# Patient Record
Sex: FEMALE | Race: WHITE | NOT HISPANIC OR LATINO | Employment: UNEMPLOYED | URBAN - METROPOLITAN AREA
[De-identification: names, ages, dates, MRNs, and addresses within clinical notes are randomized per-mention and may not be internally consistent; named-entity substitution may affect disease eponyms.]

---

## 2023-09-08 ENCOUNTER — HOSPITAL ENCOUNTER (OUTPATIENT)
Dept: DATA CONVERSION | Facility: HOSPITAL | Age: 58
End: 2023-09-08

## 2023-10-02 ENCOUNTER — EVALUATION (OUTPATIENT)
Dept: PHYSICAL THERAPY | Facility: CLINIC | Age: 58
End: 2023-10-02
Payer: COMMERCIAL

## 2023-10-02 DIAGNOSIS — I89.0 LYMPHEDEMA, NOT ELSEWHERE CLASSIFIED: ICD-10-CM

## 2023-10-02 DIAGNOSIS — I89.0 LYMPHEDEMA: Primary | ICD-10-CM

## 2023-10-02 PROCEDURE — 97530 THERAPEUTIC ACTIVITIES: CPT | Mod: GP

## 2023-10-02 PROCEDURE — 97163 PT EVAL HIGH COMPLEX 45 MIN: CPT | Mod: GP

## 2023-10-02 ASSESSMENT — ENCOUNTER SYMPTOMS
DEPRESSION: 1
OCCASIONAL FEELINGS OF UNSTEADINESS: 1
LOSS OF SENSATION IN FEET: 0
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
## 2023-10-02 NOTE — Clinical Note
October 2, 2023     Patient: Olga Joyce   YOB: 1965   Date of Visit: 10/2/2023       To Whom it May Concern:    Olga Joyce was seen in my clinic on 10/2/2023. She {Return to school/sport:67113}.    If you have any questions or concerns, please don't hesitate to call.         Sincerely,          Gloria Abraham, PT        CC: No Recipients

## 2023-10-02 NOTE — Clinical Note
October 2, 2023     Patient: Olga Joyce   YOB: 1965   Date of Visit: 10/2/2023       To Whom It May Concern:    It is my medical opinion that Olga Joyce {Work release (duty restriction):64246}.    If you have any questions or concerns, please don't hesitate to call.         Sincerely,        Gloria Abraham, PT    CC: No Recipients

## 2023-10-05 DIAGNOSIS — I89.0 LYMPHEDEMA: Primary | ICD-10-CM

## 2023-10-18 ENCOUNTER — TREATMENT (OUTPATIENT)
Dept: PHYSICAL THERAPY | Facility: CLINIC | Age: 58
End: 2023-10-18
Payer: COMMERCIAL

## 2023-10-18 DIAGNOSIS — I89.0 LYMPHEDEMA: ICD-10-CM

## 2023-10-18 PROCEDURE — 97110 THERAPEUTIC EXERCISES: CPT | Mod: GP

## 2023-10-18 ASSESSMENT — PAIN - FUNCTIONAL ASSESSMENT: PAIN_FUNCTIONAL_ASSESSMENT: 0-10

## 2023-10-18 ASSESSMENT — PAIN SCALES - GENERAL: PAINLEVEL_OUTOF10: 0 - NO PAIN

## 2023-10-18 NOTE — PROGRESS NOTES
Physical Therapy    Physical Therapy Treatment    Patient Name: Olga Joyce  MRN: [id number]  Today's Date: 10/18/2023  Time Calculation  Start Time: 1015  Stop Time: 1100  Time Calculation (min): 45 min  Visit # 2/20      Assessment:  PT Assessment  Evaluation/Treatment Tolerance: Patient tolerated treatment well  Assessment Comment: Pt tolerated exercises and self-MLD techniqes well.  Will need further instruction in diaphragmatic breathing.  Will begin to bandage once pt orders bandages. No change in pain at end of session    Plan:   Cont per POC    Current Problem  1. Lymphedema  Follow Up In Physical Therapy          Subjective   Pt arrives in wheelchair. States she has not ordered bandages yet but will do soon.       Precautions  Precautions  Precautions Comment: lymphedema     Pain  Pain Assessment: 0-10  Pain Score: 0 - No pain    Objective   Non-pitting edema B LE      Treatments:40min  Therapeutic exercise:    Instructed in and performed:  diaphragmatic breathing; toe taps, heel raises, ankle circles, toe curls, LAQ, iso hip add, iso hip abd, MIP, GS. Diaphragatic breathing.. Hand outs provided    Instructed in and performed:  clearing of cervical, axillary and inguinal lymph nodes. Handouts provided.  Education  Individual(s) Educated: Patient  Education Provided: Lymphedema care, Home Exercise Program    Goals:  Active       PT Problem       PT Goal 1       Start:  10/02/23    Expected End:  11/01/23       Pt independent with lymphedema risk-reduction strategies         PT Goal 2       Start:  10/02/23    Expected End:  12/31/23       Improve LLIS to no more than 25% impairment         PT Goal 3       Start:  10/02/23    Expected End:  12/31/23       Reduce bilateral leg girth by at least 15 %         PT Goal 4       Start:  10/02/23    Expected End:  12/31/23       Pt independent with lymphedema self-care/self-management strategies

## 2023-10-26 ENCOUNTER — APPOINTMENT (OUTPATIENT)
Dept: PHYSICAL THERAPY | Facility: CLINIC | Age: 58
End: 2023-10-26
Payer: COMMERCIAL

## 2023-10-31 ENCOUNTER — DOCUMENTATION (OUTPATIENT)
Dept: PHYSICAL THERAPY | Facility: CLINIC | Age: 58
End: 2023-10-31

## 2023-10-31 NOTE — PROGRESS NOTES
Physical Therapy                 Therapy Communication Note    Patient Name: Olga Joyce  MRN: [id number]  Today's Date: 10/31/2023     Discipline: Physical Therapy    Missed Visit Reason:      Missed Time: No Show    Comment: attempted to call. No voice mail

## 2023-11-06 ENCOUNTER — APPOINTMENT (OUTPATIENT)
Dept: PHYSICAL THERAPY | Facility: CLINIC | Age: 58
End: 2023-11-06
Payer: COMMERCIAL

## 2023-11-08 ENCOUNTER — APPOINTMENT (OUTPATIENT)
Dept: PHYSICAL THERAPY | Facility: CLINIC | Age: 58
End: 2023-11-08
Payer: COMMERCIAL

## 2023-11-08 ENCOUNTER — DOCUMENTATION (OUTPATIENT)
Dept: PHYSICAL THERAPY | Facility: CLINIC | Age: 58
End: 2023-11-08

## 2023-11-08 NOTE — PROGRESS NOTES
Physical Therapy                 Therapy Communication Note    Patient Name: Olga Joyce  MRN: [id number]  Today's Date: 11/8/2023     Discipline: Physical Therapy    Missed Visit Reason:      Missed Time: Cancel    Comment: pt is currently hospitalized

## 2023-11-13 ENCOUNTER — DOCUMENTATION (OUTPATIENT)
Dept: PHYSICAL THERAPY | Facility: CLINIC | Age: 58
End: 2023-11-13

## 2023-11-13 NOTE — PROGRESS NOTES
Physical Therapy                 Therapy Communication Note    Patient Name: Olga Joyce  MRN: [id number]  Today's Date: 11/13/2023     Discipline: Physical Therapy    Missed Visit Reason:      Missed Time: No Show    Comment:

## 2023-11-15 ENCOUNTER — NURSING HOME VISIT (OUTPATIENT)
Dept: PRIMARY CARE | Facility: CLINIC | Age: 58
End: 2023-11-15
Payer: COMMERCIAL

## 2023-11-15 VITALS
DIASTOLIC BLOOD PRESSURE: 64 MMHG | SYSTOLIC BLOOD PRESSURE: 107 MMHG | OXYGEN SATURATION: 97 % | RESPIRATION RATE: 17 BRPM | TEMPERATURE: 97.9 F | HEART RATE: 51 BPM

## 2023-11-15 DIAGNOSIS — I50.33 ACUTE ON CHRONIC DIASTOLIC CONGESTIVE HEART FAILURE (MULTI): ICD-10-CM

## 2023-11-15 DIAGNOSIS — I48.91 ATRIAL FIBRILLATION WITH RAPID VENTRICULAR RESPONSE (MULTI): Primary | ICD-10-CM

## 2023-11-15 DIAGNOSIS — I89.0 LYMPHEDEMA OF BOTH LOWER EXTREMITIES: ICD-10-CM

## 2023-11-15 DIAGNOSIS — G47.01 INSOMNIA DUE TO MEDICAL CONDITION: ICD-10-CM

## 2023-11-15 PROCEDURE — 99348 HOME/RES VST EST LOW MDM 30: CPT | Performed by: NURSE PRACTITIONER

## 2023-11-15 RX ORDER — LISINOPRIL 40 MG/1
40 TABLET ORAL DAILY
COMMUNITY
Start: 2023-05-24

## 2023-11-15 RX ORDER — GABAPENTIN 300 MG/1
300 CAPSULE ORAL NIGHTLY
COMMUNITY
Start: 2023-09-13

## 2023-11-15 RX ORDER — AMIODARONE HYDROCHLORIDE 200 MG/1
200 TABLET ORAL 2 TIMES DAILY
COMMUNITY
Start: 2023-11-12

## 2023-11-15 RX ORDER — TRAZODONE HYDROCHLORIDE 50 MG/1
50 TABLET ORAL NIGHTLY
COMMUNITY

## 2023-11-15 RX ORDER — METOPROLOL SUCCINATE 50 MG/1
TABLET, EXTENDED RELEASE ORAL
COMMUNITY
Start: 2023-11-12

## 2023-11-15 RX ORDER — OXYBUTYNIN CHLORIDE 15 MG/1
15 TABLET, EXTENDED RELEASE ORAL DAILY
COMMUNITY

## 2023-11-15 RX ORDER — RIZATRIPTAN BENZOATE 10 MG/1
10 TABLET, ORALLY DISINTEGRATING ORAL
COMMUNITY
Start: 2023-06-23

## 2023-11-15 RX ORDER — FUROSEMIDE 40 MG/1
40 TABLET ORAL DAILY
COMMUNITY

## 2023-11-15 RX ORDER — AMLODIPINE BESYLATE 10 MG/1
10 TABLET ORAL DAILY
COMMUNITY

## 2023-11-15 ASSESSMENT — ENCOUNTER SYMPTOMS
APPETITE CHANGE: 0
PALPITATIONS: 0
DIZZINESS: 1
FATIGUE: 1
WEAKNESS: 1

## 2023-11-15 NOTE — PROGRESS NOTES
Subjective   Patient ID: Olga Joyce is a 58 y.o. female who presents for follow up on hospitalization for atrial fibrillation.     Atrial Fibrillation  Symptoms include dizziness and weakness. Symptoms are negative for chest pain and palpitations. Past medical history includes atrial fibrillation.   : Patient has come to the facility for strengthening. She has a LifeVest on, in on amiodarone and has follow up with cardiology. She states that she gets dyspnea when she sits up on the side of the bed and has some dizziness. She says this has been ongoing even since before her hospital stay. She is open and ready to work on getting stronger.     Review of Systems   Constitutional:  Positive for fatigue. Negative for appetite change.   Respiratory:          See HPI   Cardiovascular:  Negative for chest pain, palpitations and leg swelling.   Musculoskeletal:  Positive for gait problem.   Skin:         Scattered bruising on bilateral arms   Neurological:  Positive for dizziness and weakness.       Objective   There were no vitals taken for this visit.    Physical Exam  Constitutional:       Appearance: She is obese.   HENT:      Nose: Nose normal.      Mouth/Throat:      Mouth: Mucous membranes are moist.   Eyes:      Conjunctiva/sclera: Conjunctivae normal.   Cardiovascular:      Rate and Rhythm: Normal rate. Rhythm irregular.   Pulmonary:      Effort: Pulmonary effort is normal.      Comments: Diminished breath sounds  Abdominal:      General: Bowel sounds are normal. There is no distension.   Musculoskeletal:         General: No swelling.      Cervical back: Normal range of motion.   Skin:     General: Skin is warm.      Findings: Bruising present.   Neurological:      General: No focal deficit present.      Mental Status: She is alert.   Psychiatric:         Mood and Affect: Mood normal.         Assessment/Plan   Diagnoses and all orders for this visit:  Atrial fibrillation with rapid ventricular response  (CMS/HCC): cont with lifevest, amiodarone, cardiology follow up  Acute on chronic diastolic congestive heart failure (CMS/HCC): cont with lasix, eliquis  Lymphedema of both lower extremities: cont with lasix, cont with lasix, encourage elevation of legs  Insomnia due to medical condition: cont with trazodone

## 2023-11-20 ENCOUNTER — APPOINTMENT (OUTPATIENT)
Dept: PHYSICAL THERAPY | Facility: CLINIC | Age: 58
End: 2023-11-20
Payer: COMMERCIAL

## 2023-11-22 ENCOUNTER — APPOINTMENT (OUTPATIENT)
Dept: PHYSICAL THERAPY | Facility: CLINIC | Age: 58
End: 2023-11-22
Payer: COMMERCIAL

## 2023-11-27 ENCOUNTER — APPOINTMENT (OUTPATIENT)
Dept: PHYSICAL THERAPY | Facility: CLINIC | Age: 58
End: 2023-11-27
Payer: COMMERCIAL

## 2023-11-29 ENCOUNTER — APPOINTMENT (OUTPATIENT)
Dept: PHYSICAL THERAPY | Facility: CLINIC | Age: 58
End: 2023-11-29
Payer: COMMERCIAL

## 2023-12-04 ENCOUNTER — APPOINTMENT (OUTPATIENT)
Dept: PHYSICAL THERAPY | Facility: CLINIC | Age: 58
End: 2023-12-04
Payer: COMMERCIAL

## 2023-12-06 ENCOUNTER — APPOINTMENT (OUTPATIENT)
Dept: PHYSICAL THERAPY | Facility: CLINIC | Age: 58
End: 2023-12-06
Payer: COMMERCIAL

## 2023-12-11 ENCOUNTER — APPOINTMENT (OUTPATIENT)
Dept: PHYSICAL THERAPY | Facility: CLINIC | Age: 58
End: 2023-12-11
Payer: COMMERCIAL

## 2023-12-13 ENCOUNTER — APPOINTMENT (OUTPATIENT)
Dept: PHYSICAL THERAPY | Facility: CLINIC | Age: 58
End: 2023-12-13
Payer: COMMERCIAL

## 2023-12-15 ENCOUNTER — NURSING HOME VISIT (OUTPATIENT)
Dept: POST ACUTE CARE | Facility: EXTERNAL LOCATION | Age: 58
End: 2023-12-15
Payer: COMMERCIAL

## 2023-12-15 DIAGNOSIS — E78.2 MIXED HYPERLIPIDEMIA: ICD-10-CM

## 2023-12-15 DIAGNOSIS — I10 PRIMARY HYPERTENSION: ICD-10-CM

## 2023-12-15 DIAGNOSIS — K21.9 GASTROESOPHAGEAL REFLUX DISEASE WITHOUT ESOPHAGITIS: ICD-10-CM

## 2023-12-15 DIAGNOSIS — I50.33 ACUTE ON CHRONIC DIASTOLIC CONGESTIVE HEART FAILURE (MULTI): ICD-10-CM

## 2023-12-15 DIAGNOSIS — I48.91 ATRIAL FIBRILLATION WITH RVR (MULTI): Primary | ICD-10-CM

## 2023-12-15 DIAGNOSIS — I89.0 LYMPHEDEMA: ICD-10-CM

## 2023-12-15 PROCEDURE — 99309 SBSQ NF CARE MODERATE MDM 30: CPT | Performed by: FAMILY MEDICINE

## 2023-12-18 ENCOUNTER — APPOINTMENT (OUTPATIENT)
Dept: PHYSICAL THERAPY | Facility: CLINIC | Age: 58
End: 2023-12-18
Payer: COMMERCIAL

## 2023-12-19 PROBLEM — M19.90 ARTHRITIS: Status: ACTIVE | Noted: 2023-12-19

## 2023-12-19 PROBLEM — E78.2 MIXED HYPERLIPIDEMIA: Status: ACTIVE | Noted: 2023-10-29

## 2023-12-19 PROBLEM — R53.83 FATIGUE: Status: ACTIVE | Noted: 2023-12-19

## 2023-12-19 PROBLEM — J31.0 CHRONIC RHINITIS: Status: ACTIVE | Noted: 2023-12-19

## 2023-12-19 PROBLEM — E87.6 HYPOKALEMIA: Status: ACTIVE | Noted: 2023-10-29

## 2023-12-19 PROBLEM — R32 URINARY INCONTINENCE: Status: ACTIVE | Noted: 2023-12-19

## 2023-12-19 PROBLEM — R01.1 MURMUR: Status: ACTIVE | Noted: 2023-12-19

## 2023-12-19 PROBLEM — E55.9 VITAMIN D DEFICIENCY, UNSPECIFIED: Status: ACTIVE | Noted: 2023-12-19

## 2023-12-19 ASSESSMENT — ENCOUNTER SYMPTOMS
PALPITATIONS: 0
VOMITING: 0
COUGH: 0
SHORTNESS OF BREATH: 0
DIZZINESS: 0
SORE THROAT: 0
CONSTIPATION: 0
CONFUSION: 0
ABDOMINAL PAIN: 0
WHEEZING: 0
FREQUENCY: 0
NAUSEA: 0
HEADACHES: 0
CHILLS: 0
DYSPHORIC MOOD: 0
DIARRHEA: 0
FATIGUE: 1
NERVOUS/ANXIOUS: 0
DYSURIA: 0
FEVER: 0
HEMATURIA: 0

## 2023-12-19 NOTE — PROGRESS NOTES
Subjective   Patient ID: Olga Joyce is a 58 y.o. female who presents for acute skilled care at [facility].  HPI patient admitted here with history of A-fib with RVR as well as CHF.  She is participating in therapies.  Blood pressure has remained stable and weights being monitored.  She had laboratory studies done earlier this week which showed a GFR 51 hemoglobin 11.9.  No increased complaints or concerns noted per resident or staff.    Review of Systems   Constitutional:  Positive for fatigue. Negative for chills and fever.   HENT:  Negative for congestion, hearing loss and sore throat.    Eyes:  Negative for visual disturbance.   Respiratory:  Negative for cough, shortness of breath and wheezing.    Cardiovascular:  Negative for chest pain and palpitations.   Gastrointestinal:  Negative for abdominal pain, constipation, diarrhea, nausea and vomiting.   Genitourinary:  Negative for dysuria, frequency, hematuria and urgency.   Musculoskeletal:  Positive for gait problem.   Skin:  Negative for rash.   Neurological:  Negative for dizziness, syncope and headaches.   Psychiatric/Behavioral:  Negative for confusion and dysphoric mood. The patient is not nervous/anxious.        Objective   There were no vitals taken for this visit.    Physical Exam  Vitals (Blood pressure 130/71 temp 98.1 pulse 55 respiration 17 weight 334 pounds) reviewed.   Constitutional:       General: She is not in acute distress.     Appearance: She is not ill-appearing.   HENT:      Head: Normocephalic.      Nose: No congestion.      Mouth/Throat:      Mouth: Mucous membranes are dry.      Pharynx: Oropharynx is clear.   Eyes:      General: No scleral icterus.     Conjunctiva/sclera: Conjunctivae normal.   Cardiovascular:      Rate and Rhythm: Normal rate and regular rhythm.      Heart sounds:      No gallop.   Pulmonary:      Effort: Pulmonary effort is normal.      Breath sounds: No wheezing or rhonchi.   Abdominal:       General: Bowel sounds are normal.      Tenderness: There is no abdominal tenderness. There is no rebound.   Musculoskeletal:      Cervical back: Neck supple.      Right lower leg: Edema present.      Left lower leg: Edema present.   Lymphadenopathy:      Cervical: No cervical adenopathy.   Skin:     General: Skin is warm and dry.   Neurological:      General: No focal deficit present.         Assessment/Plan   Problem List Items Addressed This Visit             ICD-10-CM    Lymphedema I89.0    Atrial fibrillation with RVR (CMS/[facility]) - Primary I48.91    Acute on chronic diastolic congestive heart failure (CMS/[facility]) I50.33    GERD (gastroesophageal reflux disease) K21.9    Mixed hyperlipidemia E78.2    Primary hypertension I10     CBC and CMP reordered for recheck.  Monitor heart rate for the need to adjust medications and follow-up with cardiology as scheduled.  Taking with PT and OT.

## 2023-12-20 ENCOUNTER — APPOINTMENT (OUTPATIENT)
Dept: PHYSICAL THERAPY | Facility: CLINIC | Age: 58
End: 2023-12-20
Payer: COMMERCIAL

## 2023-12-27 ENCOUNTER — APPOINTMENT (OUTPATIENT)
Dept: PHYSICAL THERAPY | Facility: CLINIC | Age: 58
End: 2023-12-27
Payer: COMMERCIAL

## 2023-12-29 ENCOUNTER — APPOINTMENT (OUTPATIENT)
Dept: PHYSICAL THERAPY | Facility: CLINIC | Age: 58
End: 2023-12-29
Payer: COMMERCIAL

## 2024-01-05 ENCOUNTER — NURSING HOME VISIT (OUTPATIENT)
Dept: POST ACUTE CARE | Facility: EXTERNAL LOCATION | Age: 59
End: 2024-01-05
Payer: COMMERCIAL

## 2024-01-05 DIAGNOSIS — K21.9 GASTROESOPHAGEAL REFLUX DISEASE WITHOUT ESOPHAGITIS: ICD-10-CM

## 2024-01-05 DIAGNOSIS — I10 PRIMARY HYPERTENSION: ICD-10-CM

## 2024-01-05 DIAGNOSIS — M19.90 ARTHRITIS: ICD-10-CM

## 2024-01-05 DIAGNOSIS — I48.91 ATRIAL FIBRILLATION WITH RVR (MULTI): Primary | ICD-10-CM

## 2024-01-05 DIAGNOSIS — E87.6 HYPOKALEMIA: ICD-10-CM

## 2024-01-05 DIAGNOSIS — E78.5 DYSLIPIDEMIA: ICD-10-CM

## 2024-01-05 PROCEDURE — 99348 HOME/RES VST EST LOW MDM 30: CPT | Performed by: FAMILY MEDICINE

## 2024-01-05 NOTE — LETTER
Patient: Olga Joyce  : 1965    Encounter Date: 2024    Subjective  Patient ID: Olga Joyce is a 58 y.o. female who is acute skilled care being seen and evaluated for multiple medical problems.    HPI patient here for rehabilitation after recent hospital stay.  She continues to have ongoing A-fib.  Anticoagulation is also ongoing with recheck labs to next week.  Will get those ordered including CBC CMP and magnesium.  No other acute issues or concerns today.    Review of Systems   Constitutional:  Positive for fatigue. Negative for chills and fever.   HENT:  Negative for congestion, hearing loss and sore throat.    Eyes:  Negative for visual disturbance.   Respiratory:  Negative for cough, shortness of breath and wheezing.    Cardiovascular:  Negative for chest pain and palpitations.   Gastrointestinal:  Negative for abdominal pain, constipation, diarrhea, nausea and vomiting.   Genitourinary:  Negative for dysuria, frequency, hematuria and urgency.   Musculoskeletal:  Positive for gait problem.   Skin:  Negative for rash.   Neurological:  Negative for dizziness, syncope and headaches.   Psychiatric/Behavioral:  Positive for confusion and dysphoric mood. The patient is not nervous/anxious.        Objective  There were no vitals taken for this visit.    Physical Exam  Vitals (138/85 pulse 53 temp 97.9 respirations 18 weight 341 pounds) reviewed.   Constitutional:       General: She is not in acute distress.     Appearance: Normal appearance. She is not ill-appearing.   HENT:      Head: Normocephalic.      Right Ear: Tympanic membrane, ear canal and external ear normal.      Left Ear: Tympanic membrane, ear canal and external ear normal.      Nose: Nose normal. No congestion.      Mouth/Throat:      Mouth: Mucous membranes are moist.      Pharynx: Oropharynx is clear.   Eyes:      General: No scleral icterus.     Extraocular Movements: Extraocular movements intact.      Conjunctiva/sclera:  Conjunctivae normal.      Pupils: Pupils are equal, round, and reactive to light.   Cardiovascular:      Rate and Rhythm: Normal rate. Rhythm irregular.      Pulses: Normal pulses.      Heart sounds: No murmur heard.     No gallop.   Pulmonary:      Effort: Pulmonary effort is normal.      Breath sounds: Normal breath sounds. No wheezing or rhonchi.   Abdominal:      General: Bowel sounds are normal. There is no distension.      Palpations: Abdomen is soft.      Tenderness: There is no abdominal tenderness. There is no rebound.   Musculoskeletal:      Cervical back: Neck supple.      Right lower leg: Edema present.      Left lower leg: Edema present.   Lymphadenopathy:      Cervical: No cervical adenopathy.   Skin:     General: Skin is warm and dry.      Coloration: Skin is not jaundiced.   Neurological:      General: No focal deficit present.      Mental Status: She is alert.      Cranial Nerves: No cranial nerve deficit.      Gait: Gait normal.         Assessment/Plan  Problem List Items Addressed This Visit             ICD-10-CM    Atrial fibrillation with RVR (CMS/HCC) - Primary I48.91    Arthritis M19.90    Dyslipidemia E78.5    GERD (gastroesophageal reflux disease) K21.9    Hypokalemia E87.6    Primary hypertension I10   Labs ordered including CBC CMP and magnesium level, continue to monitor and adjust medications accordingly     Goals    None           Electronically Signed By: Kady Conti MD   1/8/24  2:47 PM

## 2024-01-08 ASSESSMENT — ENCOUNTER SYMPTOMS
ABDOMINAL PAIN: 0
DIZZINESS: 0
FEVER: 0
FATIGUE: 1
SHORTNESS OF BREATH: 0
HEMATURIA: 0
DIARRHEA: 0
HEADACHES: 0
NERVOUS/ANXIOUS: 0
COUGH: 0
WHEEZING: 0
CHILLS: 0
DYSPHORIC MOOD: 1
NAUSEA: 0
CONFUSION: 1
VOMITING: 0
DYSURIA: 0
FREQUENCY: 0
SORE THROAT: 0
CONSTIPATION: 0
PALPITATIONS: 0

## 2024-01-08 NOTE — PROGRESS NOTES
Subjective   Patient ID: Olga Joyce is a 58 y.o. female who is acute skilled care being seen and evaluated for multiple medical problems.    HPI patient here for rehabilitation after recent hospital stay.  She continues to have ongoing A-fib.  Anticoagulation is also ongoing with recheck labs to next week.  Will get those ordered including CBC CMP and magnesium.  No other acute issues or concerns today.    Review of Systems   Constitutional:  Positive for fatigue. Negative for chills and fever.   HENT:  Negative for congestion, hearing loss and sore throat.    Eyes:  Negative for visual disturbance.   Respiratory:  Negative for cough, shortness of breath and wheezing.    Cardiovascular:  Negative for chest pain and palpitations.   Gastrointestinal:  Negative for abdominal pain, constipation, diarrhea, nausea and vomiting.   Genitourinary:  Negative for dysuria, frequency, hematuria and urgency.   Musculoskeletal:  Positive for gait problem.   Skin:  Negative for rash.   Neurological:  Negative for dizziness, syncope and headaches.   Psychiatric/Behavioral:  Positive for confusion and dysphoric mood. The patient is not nervous/anxious.        Objective   There were no vitals taken for this visit.    Physical Exam  Vitals (138/85 pulse 53 temp 97.9 respirations 18 weight 341 pounds) reviewed.   Constitutional:       General: She is not in acute distress.     Appearance: Normal appearance. She is not ill-appearing.   HENT:      Head: Normocephalic.      Right Ear: Tympanic membrane, ear canal and external ear normal.      Left Ear: Tympanic membrane, ear canal and external ear normal.      Nose: Nose normal. No congestion.      Mouth/Throat:      Mouth: Mucous membranes are moist.      Pharynx: Oropharynx is clear.   Eyes:      General: No scleral icterus.     Extraocular Movements: Extraocular movements intact.      Conjunctiva/sclera: Conjunctivae normal.      Pupils: Pupils are equal, round, and reactive to  light.   Cardiovascular:      Rate and Rhythm: Normal rate. Rhythm irregular.      Pulses: Normal pulses.      Heart sounds: No murmur heard.     No gallop.   Pulmonary:      Effort: Pulmonary effort is normal.      Breath sounds: Normal breath sounds. No wheezing or rhonchi.   Abdominal:      General: Bowel sounds are normal. There is no distension.      Palpations: Abdomen is soft.      Tenderness: There is no abdominal tenderness. There is no rebound.   Musculoskeletal:      Cervical back: Neck supple.      Right lower leg: Edema present.      Left lower leg: Edema present.   Lymphadenopathy:      Cervical: No cervical adenopathy.   Skin:     General: Skin is warm and dry.      Coloration: Skin is not jaundiced.   Neurological:      General: No focal deficit present.      Mental Status: She is alert.      Cranial Nerves: No cranial nerve deficit.      Gait: Gait normal.         Assessment/Plan   Problem List Items Addressed This Visit             ICD-10-CM    Atrial fibrillation with RVR (CMS/HCC) - Primary I48.91    Arthritis M19.90    Dyslipidemia E78.5    GERD (gastroesophageal reflux disease) K21.9    Hypokalemia E87.6    Primary hypertension I10   Labs ordered including CBC CMP and magnesium level, continue to monitor and adjust medications accordingly     Goals    None

## 2024-01-12 ENCOUNTER — NURSING HOME VISIT (OUTPATIENT)
Dept: POST ACUTE CARE | Facility: EXTERNAL LOCATION | Age: 59
End: 2024-01-12
Payer: COMMERCIAL

## 2024-01-12 DIAGNOSIS — I48.91 ATRIAL FIBRILLATION WITH RVR (MULTI): Primary | ICD-10-CM

## 2024-01-12 DIAGNOSIS — I10 PRIMARY HYPERTENSION: ICD-10-CM

## 2024-01-12 DIAGNOSIS — I89.0 LYMPHEDEMA: ICD-10-CM

## 2024-01-12 DIAGNOSIS — K21.9 GASTROESOPHAGEAL REFLUX DISEASE WITHOUT ESOPHAGITIS: ICD-10-CM

## 2024-01-12 DIAGNOSIS — N39.498 OTHER URINARY INCONTINENCE: ICD-10-CM

## 2024-01-12 PROCEDURE — 99309 SBSQ NF CARE MODERATE MDM 30: CPT | Performed by: FAMILY MEDICINE

## 2024-01-12 NOTE — LETTER
Patient: Olga Joyce  : 1965    Encounter Date: 2024    Subjective  Patient ID: Olga Joyce is a 58 y.o. female who is acute skilled care being seen and evaluated for multiple medical problems.    HPI resident now readmitted after discharging home last week.  She has ongoing weakness and inability to care for self.  Continue her medications with laboratory studies currently pending.    Review of Systems   Constitutional:  Positive for fatigue. Negative for chills and fever.   HENT:  Positive for hearing loss. Negative for congestion and sore throat.    Eyes:  Negative for visual disturbance.   Respiratory:  Negative for cough, shortness of breath and wheezing.    Cardiovascular:  Positive for leg swelling. Negative for chest pain and palpitations.   Gastrointestinal:  Negative for abdominal pain, constipation, diarrhea, nausea and vomiting.   Genitourinary:  Negative for dysuria, frequency, hematuria and urgency.   Musculoskeletal:  Positive for gait problem.   Skin:  Negative for rash.   Neurological:  Negative for dizziness, syncope and headaches.   Psychiatric/Behavioral:  Positive for confusion. Negative for dysphoric mood. The patient is not nervous/anxious.        Objective  There were no vitals taken for this visit.    Physical Exam  Vitals reviewed: 132/79 temp 98 pulse 58 respiration 17 weight 342 pounds.   Constitutional:       General: She is not in acute distress.     Appearance: She is not ill-appearing.   HENT:      Head: Normocephalic.      Nose: No congestion.      Mouth/Throat:      Mouth: Mucous membranes are dry.      Pharynx: Oropharynx is clear.   Eyes:      General: No scleral icterus.     Conjunctiva/sclera: Conjunctivae normal.   Cardiovascular:      Rate and Rhythm: Normal rate and regular rhythm.      Heart sounds:      No gallop.   Pulmonary:      Effort: Pulmonary effort is normal.      Breath sounds: No wheezing or rhonchi.   Abdominal:      General: Bowel sounds are  normal.      Tenderness: There is no abdominal tenderness. There is no rebound.   Musculoskeletal:      Cervical back: Neck supple.      Right lower leg: Edema present.      Left lower leg: Edema present.   Lymphadenopathy:      Cervical: No cervical adenopathy.   Skin:     General: Skin is warm and dry.   Neurological:      General: No focal deficit present.         Assessment/Plan  Problem List Items Addressed This Visit             ICD-10-CM    Lymphedema I89.0    Atrial fibrillation with RVR (CMS/HCC) - Primary I48.91    GERD (gastroesophageal reflux disease) K21.9    Primary hypertension I10    Urinary incontinence R32        Goals    None     Now readmitted, continue with ADL assistance, laboratory studies pending      Electronically Signed By: Kady Conti MD   1/15/24 11:27 AM

## 2024-01-15 ASSESSMENT — ENCOUNTER SYMPTOMS
DYSPHORIC MOOD: 0
FREQUENCY: 0
NERVOUS/ANXIOUS: 0
CONFUSION: 1
FEVER: 0
SORE THROAT: 0
HEADACHES: 0
PALPITATIONS: 0
FATIGUE: 1
DYSURIA: 0
DIARRHEA: 0
COUGH: 0
VOMITING: 0
HEMATURIA: 0
DIZZINESS: 0
CONSTIPATION: 0
NAUSEA: 0
WHEEZING: 0
ABDOMINAL PAIN: 0
CHILLS: 0
SHORTNESS OF BREATH: 0

## 2024-01-15 NOTE — PROGRESS NOTES
Subjective   Patient ID: Olga Joyec is a 58 y.o. female who is acute skilled care being seen and evaluated for multiple medical problems.    HPI resident now readmitted after discharging home last week.  She has ongoing weakness and inability to care for self.  Continue her medications with laboratory studies currently pending.    Review of Systems   Constitutional:  Positive for fatigue. Negative for chills and fever.   HENT:  Positive for hearing loss. Negative for congestion and sore throat.    Eyes:  Negative for visual disturbance.   Respiratory:  Negative for cough, shortness of breath and wheezing.    Cardiovascular:  Positive for leg swelling. Negative for chest pain and palpitations.   Gastrointestinal:  Negative for abdominal pain, constipation, diarrhea, nausea and vomiting.   Genitourinary:  Negative for dysuria, frequency, hematuria and urgency.   Musculoskeletal:  Positive for gait problem.   Skin:  Negative for rash.   Neurological:  Negative for dizziness, syncope and headaches.   Psychiatric/Behavioral:  Positive for confusion. Negative for dysphoric mood. The patient is not nervous/anxious.        Objective   There were no vitals taken for this visit.    Physical Exam  Vitals reviewed: 132/79 temp 98 pulse 58 respiration 17 weight 342 pounds.   Constitutional:       General: She is not in acute distress.     Appearance: She is not ill-appearing.   HENT:      Head: Normocephalic.      Nose: No congestion.      Mouth/Throat:      Mouth: Mucous membranes are dry.      Pharynx: Oropharynx is clear.   Eyes:      General: No scleral icterus.     Conjunctiva/sclera: Conjunctivae normal.   Cardiovascular:      Rate and Rhythm: Normal rate and regular rhythm.      Heart sounds:      No gallop.   Pulmonary:      Effort: Pulmonary effort is normal.      Breath sounds: No wheezing or rhonchi.   Abdominal:      General: Bowel sounds are normal.      Tenderness: There is no abdominal tenderness. There is no  rebound.   Musculoskeletal:      Cervical back: Neck supple.      Right lower leg: Edema present.      Left lower leg: Edema present.   Lymphadenopathy:      Cervical: No cervical adenopathy.   Skin:     General: Skin is warm and dry.   Neurological:      General: No focal deficit present.         Assessment/Plan   Problem List Items Addressed This Visit             ICD-10-CM    Lymphedema I89.0    Atrial fibrillation with RVR (CMS/[facility]) - Primary I48.91    GERD (gastroesophageal reflux disease) K21.9    Primary hypertension I10    Urinary incontinence R32        Goals    None     Now readmitted, continue with ADL assistance, laboratory studies pending

## 2024-01-25 ENCOUNTER — DOCUMENTATION (OUTPATIENT)
Dept: PHYSICAL THERAPY | Facility: CLINIC | Age: 59
End: 2024-01-25

## 2024-01-25 NOTE — PROGRESS NOTES
Physical Therapy    Discharge Summary    Name: Olga Joyce  MRN: [id number]  : 1965  Date: 24    Discharge Summary: PT    Discharge Information: Date of discharge 2024, Date of last visit 10/31/2023, Date of evaluation 10/2/2023, Number of attended visits 2, Referred by SIENNA Martin, and Referred for lymphedema    Therapy Summary: Pt only attended 2 PT visits.  Pt was hospitalized 10/29./2023    Discharge Status: Spoke with pt by phone.  Pt advised remaining visits will be canceled and will need new prescription from MD to return when medically cleared.      Rehab Discharge Reason: Patient requested due to medical condition

## 2024-02-09 ENCOUNTER — NURSING HOME VISIT (OUTPATIENT)
Dept: POST ACUTE CARE | Facility: EXTERNAL LOCATION | Age: 59
End: 2024-02-09
Payer: COMMERCIAL

## 2024-02-09 DIAGNOSIS — I50.33 ACUTE ON CHRONIC DIASTOLIC CONGESTIVE HEART FAILURE (MULTI): ICD-10-CM

## 2024-02-09 DIAGNOSIS — E78.2 MIXED HYPERLIPIDEMIA: Primary | ICD-10-CM

## 2024-02-09 DIAGNOSIS — K21.9 GASTROESOPHAGEAL REFLUX DISEASE WITHOUT ESOPHAGITIS: ICD-10-CM

## 2024-02-09 DIAGNOSIS — I10 PRIMARY HYPERTENSION: ICD-10-CM

## 2024-02-09 DIAGNOSIS — E87.6 HYPOKALEMIA: ICD-10-CM

## 2024-02-09 PROCEDURE — 99309 SBSQ NF CARE MODERATE MDM 30: CPT | Performed by: FAMILY MEDICINE

## 2024-02-12 ASSESSMENT — ENCOUNTER SYMPTOMS
NERVOUS/ANXIOUS: 0
FATIGUE: 1
FEVER: 0
FREQUENCY: 0
DYSPHORIC MOOD: 0
DYSURIA: 0
CONSTIPATION: 0
WHEEZING: 0
CHILLS: 0
DIZZINESS: 0
DIARRHEA: 0
CONFUSION: 1
ABDOMINAL PAIN: 0
HEADACHES: 0
PALPITATIONS: 0
SHORTNESS OF BREATH: 0
HEMATURIA: 0
VOMITING: 0
SORE THROAT: 0
COUGH: 0
NAUSEA: 0

## 2024-02-12 NOTE — PROGRESS NOTES
Subjective   Patient ID: Olga Joyce is a 58 y.o. female who is acute skilled care being seen and evaluated for multiple medical problems.    HPI resident now completing therapies after readmit from home. She is seeing cardiology next week. No acute complaints noted.  Labs reviewed from January.   Review of Systems   Constitutional:  Positive for fatigue. Negative for chills and fever.   HENT:  Positive for hearing loss. Negative for congestion and sore throat.    Eyes:  Negative for visual disturbance.   Respiratory:  Negative for cough, shortness of breath and wheezing.    Cardiovascular:  Positive for leg swelling. Negative for chest pain and palpitations.   Gastrointestinal:  Negative for abdominal pain, constipation, diarrhea, nausea and vomiting.   Genitourinary:  Negative for dysuria, frequency, hematuria and urgency.   Musculoskeletal:  Positive for gait problem.   Skin:  Negative for rash.   Neurological:  Negative for dizziness, syncope and headaches.   Psychiatric/Behavioral:  Positive for confusion. Negative for dysphoric mood. The patient is not nervous/anxious.        Objective   There were no vitals taken for this visit.    Physical Exam  Vitals reviewed: 132/79 temp 98 pulse 58 respiration 17 weight 342 pounds.   Constitutional:       General: She is not in acute distress.     Appearance: She is not ill-appearing.   HENT:      Head: Normocephalic.      Nose: No congestion.      Mouth/Throat:      Mouth: Mucous membranes are dry.      Pharynx: Oropharynx is clear.   Eyes:      General: No scleral icterus.     Conjunctiva/sclera: Conjunctivae normal.   Cardiovascular:      Rate and Rhythm: Normal rate and regular rhythm.      Heart sounds:      No gallop.   Pulmonary:      Effort: Pulmonary effort is normal.      Breath sounds: No wheezing or rhonchi.   Abdominal:      General: Bowel sounds are normal.      Tenderness: There is no abdominal tenderness. There is no rebound.   Musculoskeletal:       Cervical back: Neck supple.      Right lower leg: Edema present.      Left lower leg: Edema present.   Lymphadenopathy:      Cervical: No cervical adenopathy.   Skin:     General: Skin is warm and dry.   Neurological:      General: No focal deficit present.         Assessment/Plan   Problem List Items Addressed This Visit             ICD-10-CM    Acute on chronic diastolic congestive heart failure (CMS/HCC) I50.33    GERD (gastroesophageal reflux disease) K21.9    Mixed hyperlipidemia - Primary E78.2    Hypokalemia E87.6    Primary hypertension I10      Goals    None     Recheck cbc and cmp next week- has cardio appt. Continue with adl assistance.

## 2024-02-23 ENCOUNTER — NURSING HOME VISIT (OUTPATIENT)
Dept: POST ACUTE CARE | Facility: EXTERNAL LOCATION | Age: 59
End: 2024-02-23
Payer: COMMERCIAL

## 2024-02-23 DIAGNOSIS — E78.2 MIXED HYPERLIPIDEMIA: ICD-10-CM

## 2024-02-23 DIAGNOSIS — I48.91 ATRIAL FIBRILLATION WITH RVR (MULTI): ICD-10-CM

## 2024-02-23 DIAGNOSIS — E87.6 HYPOKALEMIA: ICD-10-CM

## 2024-02-23 DIAGNOSIS — I50.33 ACUTE ON CHRONIC DIASTOLIC CONGESTIVE HEART FAILURE (MULTI): ICD-10-CM

## 2024-02-23 DIAGNOSIS — K21.9 GASTROESOPHAGEAL REFLUX DISEASE WITHOUT ESOPHAGITIS: ICD-10-CM

## 2024-02-23 DIAGNOSIS — I89.0 LYMPHEDEMA: Primary | ICD-10-CM

## 2024-02-23 PROCEDURE — 99305 1ST NF CARE MODERATE MDM 35: CPT | Performed by: FAMILY MEDICINE

## 2024-02-26 ASSESSMENT — ENCOUNTER SYMPTOMS
DIARRHEA: 0
WHEEZING: 0
ABDOMINAL PAIN: 0
HEMATURIA: 0
CONFUSION: 1
CHILLS: 0
PALPITATIONS: 0
FREQUENCY: 0
SHORTNESS OF BREATH: 0
CONSTIPATION: 0
COUGH: 0
DYSPHORIC MOOD: 0
DIZZINESS: 0
SORE THROAT: 0
HEADACHES: 0
FATIGUE: 1
NAUSEA: 0
VOMITING: 0
NERVOUS/ANXIOUS: 0
DYSURIA: 0
FEVER: 0

## 2024-02-26 NOTE — PROGRESS NOTES
Subjective   Patient ID: Olga Joyce is a 58 y.o. female who is acute skilled care being seen and evaluated for multiple medical problems.    HPI resident now readmitted after discharging to home last week.  She subsequently was too weak to care for self and was readmitted.  She was diagnosed with a urinary tract infection for which she has resumed her antibiotics with CBC and CMP currently pending.         Review of Systems   Constitutional:  Positive for fatigue. Negative for chills and fever.   HENT:  Positive for hearing loss. Negative for congestion and sore throat.    Eyes:  Negative for visual disturbance.   Respiratory:  Negative for cough, shortness of breath and wheezing.    Cardiovascular:  Positive for leg swelling. Negative for chest pain and palpitations.   Gastrointestinal:  Negative for abdominal pain, constipation, diarrhea, nausea and vomiting.   Genitourinary:  Negative for dysuria, frequency, hematuria and urgency.   Musculoskeletal:  Positive for gait problem.   Skin:  Negative for rash.   Neurological:  Negative for dizziness, syncope and headaches.   Psychiatric/Behavioral:  Positive for confusion. Negative for dysphoric mood. The patient is not nervous/anxious.        Objective   There were no vitals taken for this visit.    Physical Exam  Vitals reviewed: 132/76 temp 97.8 pulse 78 respirations 18 weight 341 pounds.   Constitutional:       General: She is not in acute distress.     Appearance: She is not ill-appearing.   HENT:      Head: Normocephalic.      Nose: No congestion.      Mouth/Throat:      Mouth: Mucous membranes are dry.      Pharynx: Oropharynx is clear.   Eyes:      General: No scleral icterus.     Conjunctiva/sclera: Conjunctivae normal.   Cardiovascular:      Rate and Rhythm: Normal rate and regular rhythm.      Heart sounds:      No gallop.   Pulmonary:      Effort: Pulmonary effort is normal.      Breath sounds: No wheezing or rhonchi.   Abdominal:      General: Bowel  sounds are normal.      Tenderness: There is no abdominal tenderness. There is no rebound.   Musculoskeletal:      Cervical back: Neck supple.      Right lower leg: Edema present.      Left lower leg: Edema present.   Lymphadenopathy:      Cervical: No cervical adenopathy.   Skin:     General: Skin is warm and dry.   Neurological:      General: No focal deficit present.         Assessment/Plan   Problem List Items Addressed This Visit             ICD-10-CM    Lymphedema - Primary I89.0    Atrial fibrillation with RVR (CMS/[facility]) I48.91    Acute on chronic diastolic congestive heart failure (CMS/[facility]) I50.33    GERD (gastroesophageal reflux disease) K21.9    Mixed hyperlipidemia E78.2    Hypokalemia E87.6      Goals    None     CBC and CMP pending, continue with elevation and compression for lymphedema.  May ultimately need to stay here long-term due to inability to care for ADLs e.

## 2024-03-23 ENCOUNTER — NURSING HOME VISIT (OUTPATIENT)
Dept: POST ACUTE CARE | Facility: EXTERNAL LOCATION | Age: 59
End: 2024-03-23
Payer: COMMERCIAL

## 2024-03-23 DIAGNOSIS — I10 PRIMARY HYPERTENSION: ICD-10-CM

## 2024-03-23 DIAGNOSIS — E78.2 MIXED HYPERLIPIDEMIA: ICD-10-CM

## 2024-03-23 DIAGNOSIS — I89.0 LYMPHEDEMA: Primary | ICD-10-CM

## 2024-03-23 DIAGNOSIS — I48.91 ATRIAL FIBRILLATION WITH RVR (MULTI): ICD-10-CM

## 2024-03-23 PROCEDURE — 99308 SBSQ NF CARE LOW MDM 20: CPT | Performed by: FAMILY MEDICINE

## 2024-03-23 NOTE — LETTER
Patient: Olga Joyce  : 1965    Encounter Date: 2024    Subjective  Patient ID: Olga Joyce is a 59 y.o. female who is acute skilled care being seen and evaluated for multiple medical problems.    HPI resident now readmitted after discharging to home last week.  She subsequently was too weak to care for self and more complicated by chronic lymphedema.  Since arriving she has adjusted well to facility.  Urinary tract infection has now cleared.  CBC and CMP from last month reviewed and stable.         Review of Systems   Constitutional:  Positive for fatigue. Negative for chills and fever.   HENT:  Negative for congestion, hearing loss and sore throat.    Eyes:  Negative for visual disturbance.   Respiratory:  Negative for cough, shortness of breath and wheezing.    Cardiovascular:  Positive for leg swelling. Negative for chest pain and palpitations.   Gastrointestinal:  Negative for abdominal pain, constipation, diarrhea, nausea and vomiting.   Genitourinary:  Negative for dysuria, frequency, hematuria and urgency.   Musculoskeletal:  Positive for gait problem.   Skin:  Negative for rash.   Neurological:  Negative for dizziness, syncope and headaches.   Psychiatric/Behavioral:  Positive for confusion. Negative for dysphoric mood. The patient is not nervous/anxious.        Objective  There were no vitals taken for this visit.    Physical Exam  Vitals reviewed: 132/72 temp 97.9 pulse 78 respiration 17 weight 349 pounds which is 8 pounds higher than last month 132/76 temp 97.8 pulse 78 respirations 18 weight 341 pounds.   Constitutional:       General: She is not in acute distress.     Appearance: She is not ill-appearing.   HENT:      Head: Normocephalic.      Nose: No congestion.      Mouth/Throat:      Mouth: Mucous membranes are dry.      Pharynx: Oropharynx is clear.   Eyes:      General: No scleral icterus.     Conjunctiva/sclera: Conjunctivae normal.   Cardiovascular:      Rate and Rhythm:  Normal rate and regular rhythm.      Heart sounds:      No gallop.   Pulmonary:      Effort: Pulmonary effort is normal.      Breath sounds: No wheezing or rhonchi.   Abdominal:      General: Bowel sounds are normal.      Tenderness: There is no abdominal tenderness. There is no rebound.   Musculoskeletal:      Cervical back: Neck supple.      Right lower leg: Edema present.      Left lower leg: Edema present.   Lymphadenopathy:      Cervical: No cervical adenopathy.   Skin:     General: Skin is warm and dry.   Neurological:      General: No focal deficit present.         Assessment/Plan  Problem List Items Addressed This Visit             ICD-10-CM    Lymphedema - Primary I89.0    Atrial fibrillation with RVR (CMS/HCC) I48.91    Mixed hyperlipidemia E78.2    Primary hypertension I10      Goals    None     Labs stable, continue to encourage compression and elevation although her legs are often down during the day.  Heart rate has been well-controlled.  Follow-up with cardiology as scheduled.        Electronically Signed By: Kady Conti MD   3/25/24  2:47 PM

## 2024-03-25 ASSESSMENT — ENCOUNTER SYMPTOMS
HEADACHES: 0
SORE THROAT: 0
ABDOMINAL PAIN: 0
CHILLS: 0
NERVOUS/ANXIOUS: 0
DIZZINESS: 0
FEVER: 0
VOMITING: 0
FREQUENCY: 0
SHORTNESS OF BREATH: 0
CONFUSION: 1
DYSURIA: 0
COUGH: 0
CONSTIPATION: 0
PALPITATIONS: 0
FATIGUE: 1
WHEEZING: 0
DIARRHEA: 0
HEMATURIA: 0
DYSPHORIC MOOD: 0
NAUSEA: 0

## 2024-03-25 NOTE — PROGRESS NOTES
Subjective   Patient ID: Olga Joyce is a 59 y.o. female who is acute skilled care being seen and evaluated for multiple medical problems.    HPI resident now readmitted after discharging to home last week.  She subsequently was too weak to care for self and more complicated by chronic lymphedema.  Since arriving she has adjusted well to facility.  Urinary tract infection has now cleared.  CBC and CMP from last month reviewed and stable.         Review of Systems   Constitutional:  Positive for fatigue. Negative for chills and fever.   HENT:  Negative for congestion, hearing loss and sore throat.    Eyes:  Negative for visual disturbance.   Respiratory:  Negative for cough, shortness of breath and wheezing.    Cardiovascular:  Positive for leg swelling. Negative for chest pain and palpitations.   Gastrointestinal:  Negative for abdominal pain, constipation, diarrhea, nausea and vomiting.   Genitourinary:  Negative for dysuria, frequency, hematuria and urgency.   Musculoskeletal:  Positive for gait problem.   Skin:  Negative for rash.   Neurological:  Negative for dizziness, syncope and headaches.   Psychiatric/Behavioral:  Positive for confusion. Negative for dysphoric mood. The patient is not nervous/anxious.        Objective   There were no vitals taken for this visit.    Physical Exam  Vitals reviewed: 132/72 temp 97.9 pulse 78 respiration 17 weight 349 pounds which is 8 pounds higher than last month 132/76 temp 97.8 pulse 78 respirations 18 weight 341 pounds.   Constitutional:       General: She is not in acute distress.     Appearance: She is not ill-appearing.   HENT:      Head: Normocephalic.      Nose: No congestion.      Mouth/Throat:      Mouth: Mucous membranes are dry.      Pharynx: Oropharynx is clear.   Eyes:      General: No scleral icterus.     Conjunctiva/sclera: Conjunctivae normal.   Cardiovascular:      Rate and Rhythm: Normal rate and regular rhythm.      Heart sounds:      No gallop.    Pulmonary:      Effort: Pulmonary effort is normal.      Breath sounds: No wheezing or rhonchi.   Abdominal:      General: Bowel sounds are normal.      Tenderness: There is no abdominal tenderness. There is no rebound.   Musculoskeletal:      Cervical back: Neck supple.      Right lower leg: Edema present.      Left lower leg: Edema present.   Lymphadenopathy:      Cervical: No cervical adenopathy.   Skin:     General: Skin is warm and dry.   Neurological:      General: No focal deficit present.         Assessment/Plan   Problem List Items Addressed This Visit             ICD-10-CM    Lymphedema - Primary I89.0    Atrial fibrillation with RVR (CMS/HCC) I48.91    Mixed hyperlipidemia E78.2    Primary hypertension I10      Goals    None     Labs stable, continue to encourage compression and elevation although her legs are often down during the day.  Heart rate has been well-controlled.  Follow-up with cardiology as scheduled.

## 2024-04-19 ENCOUNTER — NURSING HOME VISIT (OUTPATIENT)
Dept: POST ACUTE CARE | Facility: EXTERNAL LOCATION | Age: 59
End: 2024-04-19
Payer: COMMERCIAL

## 2024-04-19 DIAGNOSIS — K21.9 GASTROESOPHAGEAL REFLUX DISEASE WITHOUT ESOPHAGITIS: ICD-10-CM

## 2024-04-19 DIAGNOSIS — I10 PRIMARY HYPERTENSION: Primary | ICD-10-CM

## 2024-04-19 DIAGNOSIS — E78.2 MIXED HYPERLIPIDEMIA: ICD-10-CM

## 2024-04-19 DIAGNOSIS — I48.91 ATRIAL FIBRILLATION WITH RVR (MULTI): ICD-10-CM

## 2024-04-19 DIAGNOSIS — I89.0 LYMPHEDEMA: ICD-10-CM

## 2024-04-19 PROCEDURE — 99308 SBSQ NF CARE LOW MDM 20: CPT | Performed by: FAMILY MEDICINE

## 2024-04-19 NOTE — LETTER
Patient: Olga Joyce  : 1965    Encounter Date: 2024    Subjective  Patient ID: Olga Joyce is a 59 y.o. female who is acute skilled care being seen and evaluated for multiple medical problems.    HPI resident now readmitted after discharging to home last week.  She subsequently was too weak to care for self and more complicated by chronic lymphedema.  She is doing well since coming back to the facility.  Tesfaye studies done in March reviewed and stable.  Plan for recheck laboratory studies in July.          Review of Systems   Constitutional:  Positive for fatigue. Negative for chills and fever.   HENT:  Negative for congestion, hearing loss and sore throat.    Eyes:  Negative for visual disturbance.   Respiratory:  Negative for cough, shortness of breath and wheezing.    Cardiovascular:  Positive for leg swelling. Negative for chest pain and palpitations.   Gastrointestinal:  Negative for abdominal pain, constipation, diarrhea, nausea and vomiting.   Genitourinary:  Negative for dysuria, frequency, hematuria and urgency.   Musculoskeletal:  Positive for gait problem.   Skin:  Negative for rash.   Neurological:  Negative for dizziness, syncope and headaches.   Psychiatric/Behavioral:  Positive for confusion. Negative for dysphoric mood. The patient is not nervous/anxious.        Objective  There were no vitals taken for this visit.    Physical Exam  Vitals reviewed: 130/76 temp 97.8 pulse 82 respirations 18 weight 355 pounds which is up 6 pounds from last month.   Constitutional:       General: She is not in acute distress.     Appearance: She is not ill-appearing.   HENT:      Head: Normocephalic.      Nose: No congestion.      Mouth/Throat:      Mouth: Mucous membranes are dry.      Pharynx: Oropharynx is clear.   Eyes:      General: No scleral icterus.     Conjunctiva/sclera: Conjunctivae normal.   Cardiovascular:      Rate and Rhythm: Normal rate and regular rhythm.      Heart sounds:      No  gallop.   Pulmonary:      Effort: Pulmonary effort is normal.      Breath sounds: No wheezing or rhonchi.   Abdominal:      General: Bowel sounds are normal.      Tenderness: There is no abdominal tenderness. There is no rebound.   Musculoskeletal:      Cervical back: Neck supple.      Right lower leg: Edema present.      Left lower leg: Edema present.   Lymphadenopathy:      Cervical: No cervical adenopathy.   Skin:     General: Skin is warm and dry.   Neurological:      General: No focal deficit present.         Assessment/Plan  Problem List Items Addressed This Visit             ICD-10-CM    Lymphedema - Primary I89.0    Atrial fibrillation with RVR (Multi) I48.91    Mixed hyperlipidemia E78.2    Primary hypertension I10      Goals    None     No increase shortness of breath noted she has chronic edema which fluctuates quite a bit.  We are trending weights but pulse ox has remained stable.  Plan for recheck laboratory studies in July.  Does have follow-up with cardiology scheduled.        Electronically Signed By: Kady Conti MD   4/22/24  3:47 PM

## 2024-04-22 ASSESSMENT — ENCOUNTER SYMPTOMS
WHEEZING: 0
NAUSEA: 0
ABDOMINAL PAIN: 0
SHORTNESS OF BREATH: 0
SORE THROAT: 0
FATIGUE: 1
CHILLS: 0
DIZZINESS: 0
COUGH: 0
FEVER: 0
HEADACHES: 0
DIARRHEA: 0
FREQUENCY: 0
VOMITING: 0
CONFUSION: 1
NERVOUS/ANXIOUS: 0
DYSPHORIC MOOD: 0
DYSURIA: 0
CONSTIPATION: 0
PALPITATIONS: 0
HEMATURIA: 0

## 2024-04-22 NOTE — PROGRESS NOTES
Subjective   Patient ID: Olga Joyce is a 59 y.o. female who is acute skilled care being seen and evaluated for multiple medical problems.    HPI resident now readmitted after discharging to home last week.  She subsequently was too weak to care for self and more complicated by chronic lymphedema.  She is doing well since coming back to the facility.  Tesfaye studies done in March reviewed and stable.  Plan for recheck laboratory studies in July.          Review of Systems   Constitutional:  Positive for fatigue. Negative for chills and fever.   HENT:  Negative for congestion, hearing loss and sore throat.    Eyes:  Negative for visual disturbance.   Respiratory:  Negative for cough, shortness of breath and wheezing.    Cardiovascular:  Positive for leg swelling. Negative for chest pain and palpitations.   Gastrointestinal:  Negative for abdominal pain, constipation, diarrhea, nausea and vomiting.   Genitourinary:  Negative for dysuria, frequency, hematuria and urgency.   Musculoskeletal:  Positive for gait problem.   Skin:  Negative for rash.   Neurological:  Negative for dizziness, syncope and headaches.   Psychiatric/Behavioral:  Positive for confusion. Negative for dysphoric mood. The patient is not nervous/anxious.        Objective   There were no vitals taken for this visit.    Physical Exam  Vitals reviewed: 130/76 temp 97.8 pulse 82 respirations 18 weight 355 pounds which is up 6 pounds from last month.   Constitutional:       General: She is not in acute distress.     Appearance: She is not ill-appearing.   HENT:      Head: Normocephalic.      Nose: No congestion.      Mouth/Throat:      Mouth: Mucous membranes are dry.      Pharynx: Oropharynx is clear.   Eyes:      General: No scleral icterus.     Conjunctiva/sclera: Conjunctivae normal.   Cardiovascular:      Rate and Rhythm: Normal rate and regular rhythm.      Heart sounds:      No gallop.   Pulmonary:      Effort: Pulmonary effort is normal.       Breath sounds: No wheezing or rhonchi.   Abdominal:      General: Bowel sounds are normal.      Tenderness: There is no abdominal tenderness. There is no rebound.   Musculoskeletal:      Cervical back: Neck supple.      Right lower leg: Edema present.      Left lower leg: Edema present.   Lymphadenopathy:      Cervical: No cervical adenopathy.   Skin:     General: Skin is warm and dry.   Neurological:      General: No focal deficit present.         Assessment/Plan   Problem List Items Addressed This Visit             ICD-10-CM    Lymphedema - Primary I89.0    Atrial fibrillation with RVR (Multi) I48.91    Mixed hyperlipidemia E78.2    Primary hypertension I10      Goals    None     No increase shortness of breath noted she has chronic edema which fluctuates quite a bit.  We are trending weights but pulse ox has remained stable.  Plan for recheck laboratory studies in July.  Does have follow-up with cardiology scheduled.

## 2024-05-17 ENCOUNTER — NURSING HOME VISIT (OUTPATIENT)
Dept: POST ACUTE CARE | Facility: EXTERNAL LOCATION | Age: 59
End: 2024-05-17

## 2024-05-17 DIAGNOSIS — I89.0 LYMPHEDEMA: Primary | ICD-10-CM

## 2024-05-17 DIAGNOSIS — E78.2 MIXED HYPERLIPIDEMIA: ICD-10-CM

## 2024-05-17 DIAGNOSIS — K21.9 GASTROESOPHAGEAL REFLUX DISEASE WITHOUT ESOPHAGITIS: ICD-10-CM

## 2024-05-17 PROCEDURE — 99308 SBSQ NF CARE LOW MDM 20: CPT | Performed by: FAMILY MEDICINE

## 2024-05-17 NOTE — LETTER
Patient: Olga Joyce  : 1965    Encounter Date: 2024    Subjective  Patient ID: Olga Joyce is a 59 y.o. female who is seen for long term care being seen and evaluated for multiple medical problems.    HPI  she continues to need adl assistance and labs from march reviewed with rechecks due in July. She has ongoing chronic lymphedema.  She is doing well since coming back to the facility.            Review of Systems   Constitutional:  Positive for fatigue. Negative for chills and fever.   HENT:  Negative for congestion, hearing loss and sore throat.    Eyes:  Negative for visual disturbance.   Respiratory:  Negative for cough, shortness of breath and wheezing.    Cardiovascular:  Positive for leg swelling. Negative for chest pain and palpitations.   Gastrointestinal:  Negative for abdominal pain, constipation, diarrhea, nausea and vomiting.   Genitourinary:  Negative for dysuria, frequency, hematuria and urgency.   Musculoskeletal:  Positive for gait problem.   Skin:  Negative for rash.   Neurological:  Negative for dizziness, syncope and headaches.   Psychiatric/Behavioral:  Positive for confusion. Negative for dysphoric mood. The patient is not nervous/anxious.        Objective  There were no vitals taken for this visit.    Physical Exam  Vitals reviewed: 126/76 97.9 76 18 wt 356-stable.   Constitutional:       General: She is not in acute distress.     Appearance: She is not ill-appearing.   HENT:      Head: Normocephalic.      Nose: No congestion.      Mouth/Throat:      Mouth: Mucous membranes are dry.      Pharynx: Oropharynx is clear.   Eyes:      General: No scleral icterus.     Conjunctiva/sclera: Conjunctivae normal.   Cardiovascular:      Rate and Rhythm: Normal rate and regular rhythm.      Heart sounds:      No gallop.   Pulmonary:      Effort: Pulmonary effort is normal.      Breath sounds: No wheezing or rhonchi.   Abdominal:      General: Bowel sounds are normal.      Tenderness:  There is no abdominal tenderness. There is no rebound.   Musculoskeletal:      Cervical back: Neck supple.      Right lower leg: Edema present.      Left lower leg: Edema present.   Lymphadenopathy:      Cervical: No cervical adenopathy.   Skin:     General: Skin is warm and dry.   Neurological:      General: No focal deficit present.         Assessment/Plan  Problem List Items Addressed This Visit             ICD-10-CM    Lymphedema - Primary I89.0    GERD (gastroesophageal reflux disease) K21.9    Mixed hyperlipidemia E78.2        Goals    None     No increase shortness of breath noted she has chronic edema which fluctuates quite a bit.  We are trending weights but pulse ox has remained stable.  Plan for recheck laboratory studies in July.      Electronically Signed By: Kady Conti MD   5/19/24  8:37 PM

## 2024-05-19 ASSESSMENT — ENCOUNTER SYMPTOMS
FREQUENCY: 0
SORE THROAT: 0
CONSTIPATION: 0
DIARRHEA: 0
CHILLS: 0
WHEEZING: 0
PALPITATIONS: 0
COUGH: 0
DYSPHORIC MOOD: 0
HEMATURIA: 0
CONFUSION: 1
DIZZINESS: 0
NAUSEA: 0
FEVER: 0
ABDOMINAL PAIN: 0
DYSURIA: 0
FATIGUE: 1
HEADACHES: 0
NERVOUS/ANXIOUS: 0
VOMITING: 0
SHORTNESS OF BREATH: 0

## 2024-05-20 NOTE — PROGRESS NOTES
Subjective   Patient ID: Olga Joyce is a 59 y.o. female who is seen for long term care being seen and evaluated for multiple medical problems.    HPI  she continues to need adl assistance and labs from march reviewed with rechecks due in July. She has ongoing chronic lymphedema.  She is doing well since coming back to the facility.            Review of Systems   Constitutional:  Positive for fatigue. Negative for chills and fever.   HENT:  Negative for congestion, hearing loss and sore throat.    Eyes:  Negative for visual disturbance.   Respiratory:  Negative for cough, shortness of breath and wheezing.    Cardiovascular:  Positive for leg swelling. Negative for chest pain and palpitations.   Gastrointestinal:  Negative for abdominal pain, constipation, diarrhea, nausea and vomiting.   Genitourinary:  Negative for dysuria, frequency, hematuria and urgency.   Musculoskeletal:  Positive for gait problem.   Skin:  Negative for rash.   Neurological:  Negative for dizziness, syncope and headaches.   Psychiatric/Behavioral:  Positive for confusion. Negative for dysphoric mood. The patient is not nervous/anxious.        Objective   There were no vitals taken for this visit.    Physical Exam  Vitals reviewed: 126/76 97.9 76 18 wt 356-stable.   Constitutional:       General: She is not in acute distress.     Appearance: She is not ill-appearing.   HENT:      Head: Normocephalic.      Nose: No congestion.      Mouth/Throat:      Mouth: Mucous membranes are dry.      Pharynx: Oropharynx is clear.   Eyes:      General: No scleral icterus.     Conjunctiva/sclera: Conjunctivae normal.   Cardiovascular:      Rate and Rhythm: Normal rate and regular rhythm.      Heart sounds:      No gallop.   Pulmonary:      Effort: Pulmonary effort is normal.      Breath sounds: No wheezing or rhonchi.   Abdominal:      General: Bowel sounds are normal.      Tenderness: There is no abdominal tenderness. There is no rebound.    Musculoskeletal:      Cervical back: Neck supple.      Right lower leg: Edema present.      Left lower leg: Edema present.   Lymphadenopathy:      Cervical: No cervical adenopathy.   Skin:     General: Skin is warm and dry.   Neurological:      General: No focal deficit present.         Assessment/Plan   Problem List Items Addressed This Visit             ICD-10-CM    Lymphedema - Primary I89.0    GERD (gastroesophageal reflux disease) K21.9    Mixed hyperlipidemia E78.2        Goals    None     No increase shortness of breath noted she has chronic edema which fluctuates quite a bit.  We are trending weights but pulse ox has remained stable.  Plan for recheck laboratory studies in July.

## 2024-06-21 ENCOUNTER — NURSING HOME VISIT (OUTPATIENT)
Dept: POST ACUTE CARE | Facility: EXTERNAL LOCATION | Age: 59
End: 2024-06-21
Payer: MEDICAID

## 2024-06-21 DIAGNOSIS — E78.2 MIXED HYPERLIPIDEMIA: ICD-10-CM

## 2024-06-21 DIAGNOSIS — K21.9 GASTROESOPHAGEAL REFLUX DISEASE WITHOUT ESOPHAGITIS: ICD-10-CM

## 2024-06-21 DIAGNOSIS — I48.91 ATRIAL FIBRILLATION WITH RVR (MULTI): ICD-10-CM

## 2024-06-21 DIAGNOSIS — R01.1 MURMUR: ICD-10-CM

## 2024-06-21 DIAGNOSIS — J31.0 CHRONIC RHINITIS: Primary | ICD-10-CM

## 2024-06-21 PROCEDURE — 99309 SBSQ NF CARE MODERATE MDM 30: CPT | Performed by: FAMILY MEDICINE

## 2024-06-21 NOTE — LETTER
Patient: Olga Joyce  : 1965    Encounter Date: 2024    Subjective  Patient ID: Olga Joyce is a 59 y.o. female who is seen for long term care being seen and evaluated for multiple medical problems.    HPI  she continues to need adl assistance and labs from march reviewed with rechecks due and we will get those ordered. She has ongoing chronic lymphedema.  She is doing well since coming back to the facility.    Only new issue is headache that has been worse for last 2 weeks, she does have sinus sx and is not on any medications for that.        Review of Systems   Constitutional:  Positive for fatigue. Negative for chills and fever.   HENT:  Negative for congestion, hearing loss and sore throat.    Eyes:  Negative for visual disturbance.   Respiratory:  Negative for cough, shortness of breath and wheezing.    Cardiovascular:  Positive for leg swelling. Negative for chest pain and palpitations.   Gastrointestinal:  Negative for abdominal pain, constipation, diarrhea, nausea and vomiting.   Genitourinary:  Negative for dysuria, frequency, hematuria and urgency.   Musculoskeletal:  Positive for gait problem.   Skin:  Negative for rash.   Neurological:  Negative for dizziness, syncope and headaches.   Psychiatric/Behavioral:  Positive for confusion. Negative for dysphoric mood. The patient is not nervous/anxious.        Objective  There were no vitals taken for this visit.    Physical Exam  Vitals reviewed: 131/73 85 97.6 wt 351 from 356.   Constitutional:       General: She is not in acute distress.     Appearance: She is not ill-appearing.   HENT:      Head: Normocephalic.      Nose: No congestion.      Mouth/Throat:      Mouth: Mucous membranes are dry.      Pharynx: Oropharynx is clear.   Eyes:      General: No scleral icterus.     Conjunctiva/sclera: Conjunctivae normal.   Cardiovascular:      Rate and Rhythm: Normal rate and regular rhythm.      Heart sounds:      No gallop.   Pulmonary:       Effort: Pulmonary effort is normal.      Breath sounds: No wheezing or rhonchi.   Abdominal:      General: Bowel sounds are normal.      Tenderness: There is no abdominal tenderness. There is no rebound.   Musculoskeletal:      Cervical back: Neck supple.      Right lower leg: Edema present.      Left lower leg: Edema present.   Lymphadenopathy:      Cervical: No cervical adenopathy.   Skin:     General: Skin is warm and dry.   Neurological:      General: No focal deficit present.         Assessment/Plan  Problem List Items Addressed This Visit             ICD-10-CM    Atrial fibrillation with RVR (Multi) I48.91    Chronic rhinitis - Primary J31.0    GERD (gastroesophageal reflux disease) K21.9    Mixed hyperlipidemia E78.2    Murmur R01.1       Add flonase and zyrtec, monitor sx, labs ordered-cbc cmp lipids -wts back down this month likely due to edema fluctuating   Goals    None           Electronically Signed By: Kady Conti MD   6/24/24  8:21 PM

## 2024-06-24 ASSESSMENT — ENCOUNTER SYMPTOMS
ABDOMINAL PAIN: 0
CONSTIPATION: 0
FATIGUE: 1
VOMITING: 0
DYSURIA: 0
DIZZINESS: 0
SORE THROAT: 0
PALPITATIONS: 0
COUGH: 0
DIARRHEA: 0
FEVER: 0
CHILLS: 0
HEADACHES: 0
SHORTNESS OF BREATH: 0
NAUSEA: 0
NERVOUS/ANXIOUS: 0
WHEEZING: 0
HEMATURIA: 0
CONFUSION: 1
FREQUENCY: 0
DYSPHORIC MOOD: 0

## 2024-06-25 NOTE — PROGRESS NOTES
Subjective   Patient ID: Olga Joyce is a 59 y.o. female who is seen for long term care being seen and evaluated for multiple medical problems.    HPI  she continues to need adl assistance and labs from march reviewed with rechecks due and we will get those ordered. She has ongoing chronic lymphedema.  She is doing well since coming back to the facility.    Only new issue is headache that has been worse for last 2 weeks, she does have sinus sx and is not on any medications for that.        Review of Systems   Constitutional:  Positive for fatigue. Negative for chills and fever.   HENT:  Negative for congestion, hearing loss and sore throat.    Eyes:  Negative for visual disturbance.   Respiratory:  Negative for cough, shortness of breath and wheezing.    Cardiovascular:  Positive for leg swelling. Negative for chest pain and palpitations.   Gastrointestinal:  Negative for abdominal pain, constipation, diarrhea, nausea and vomiting.   Genitourinary:  Negative for dysuria, frequency, hematuria and urgency.   Musculoskeletal:  Positive for gait problem.   Skin:  Negative for rash.   Neurological:  Negative for dizziness, syncope and headaches.   Psychiatric/Behavioral:  Positive for confusion. Negative for dysphoric mood. The patient is not nervous/anxious.        Objective   There were no vitals taken for this visit.    Physical Exam  Vitals reviewed: 131/73 85 97.6 wt 351 from 356.   Constitutional:       General: She is not in acute distress.     Appearance: She is not ill-appearing.   HENT:      Head: Normocephalic.      Nose: No congestion.      Mouth/Throat:      Mouth: Mucous membranes are dry.      Pharynx: Oropharynx is clear.   Eyes:      General: No scleral icterus.     Conjunctiva/sclera: Conjunctivae normal.   Cardiovascular:      Rate and Rhythm: Normal rate and regular rhythm.      Heart sounds:      No gallop.   Pulmonary:      Effort: Pulmonary effort is normal.      Breath sounds: No wheezing or  rhonchi.   Abdominal:      General: Bowel sounds are normal.      Tenderness: There is no abdominal tenderness. There is no rebound.   Musculoskeletal:      Cervical back: Neck supple.      Right lower leg: Edema present.      Left lower leg: Edema present.   Lymphadenopathy:      Cervical: No cervical adenopathy.   Skin:     General: Skin is warm and dry.   Neurological:      General: No focal deficit present.         Assessment/Plan   Problem List Items Addressed This Visit             ICD-10-CM    Atrial fibrillation with RVR (Multi) I48.91    Chronic rhinitis - Primary J31.0    GERD (gastroesophageal reflux disease) K21.9    Mixed hyperlipidemia E78.2    Murmur R01.1       Add flonase and zyrtec, monitor sx, labs ordered-cbc cmp lipids -wts back down this month likely due to edema fluctuating   Goals    None

## 2024-08-02 ENCOUNTER — NURSING HOME VISIT (OUTPATIENT)
Dept: POST ACUTE CARE | Facility: EXTERNAL LOCATION | Age: 59
End: 2024-08-02
Payer: MEDICAID

## 2024-08-02 DIAGNOSIS — E78.2 MIXED HYPERLIPIDEMIA: Primary | ICD-10-CM

## 2024-08-02 DIAGNOSIS — I48.91 ATRIAL FIBRILLATION WITH RVR (MULTI): ICD-10-CM

## 2024-08-02 DIAGNOSIS — I50.33 ACUTE ON CHRONIC DIASTOLIC CONGESTIVE HEART FAILURE (MULTI): ICD-10-CM

## 2024-08-02 DIAGNOSIS — I10 PRIMARY HYPERTENSION: ICD-10-CM

## 2024-08-02 PROCEDURE — 99309 SBSQ NF CARE MODERATE MDM 30: CPT | Performed by: FAMILY MEDICINE

## 2024-08-02 NOTE — LETTER
Patient: Olga Joyce  : 1965    Encounter Date: 2024    Subjective  Patient ID: Olga Joyce is a 59 y.o. female who is seen for long term care being seen and evaluated for multiple medical problems.    HPI  she continues to need adl assistance and labs from march reviewed with rechecks due and we will get those ordered. She has ongoing chronic lymphedema.  She is doing well since coming back to the facility.   Headaches better. She is due for labs and ordered for next week.         Review of Systems   Constitutional:  Positive for fatigue. Negative for chills and fever.   HENT:  Negative for congestion, hearing loss and sore throat.    Eyes:  Negative for visual disturbance.   Respiratory:  Negative for cough, shortness of breath and wheezing.    Cardiovascular:  Positive for leg swelling. Negative for chest pain and palpitations.   Gastrointestinal:  Negative for abdominal pain, constipation, diarrhea, nausea and vomiting.   Genitourinary:  Negative for dysuria, frequency, hematuria and urgency.   Musculoskeletal:  Positive for gait problem.   Skin:  Negative for rash.   Neurological:  Negative for dizziness, syncope and headaches.   Psychiatric/Behavioral:  Positive for confusion. Negative for dysphoric mood. The patient is not nervous/anxious.        Objective  There were no vitals taken for this visit.    Physical Exam  Vitals reviewed: 112/68 62 97.6 18 wt 355-up 4 lbs.   Constitutional:       General: She is not in acute distress.     Appearance: She is not ill-appearing.   HENT:      Head: Normocephalic.      Nose: No congestion.      Mouth/Throat:      Mouth: Mucous membranes are dry.      Pharynx: Oropharynx is clear.   Eyes:      General: No scleral icterus.     Conjunctiva/sclera: Conjunctivae normal.   Cardiovascular:      Rate and Rhythm: Normal rate and regular rhythm.      Heart sounds:      No gallop.   Pulmonary:      Effort: Pulmonary effort is normal.      Breath sounds: No  wheezing or rhonchi.   Abdominal:      General: Bowel sounds are normal.      Tenderness: There is no abdominal tenderness. There is no rebound.   Musculoskeletal:      Cervical back: Neck supple.      Right lower leg: Edema present.      Left lower leg: Edema present.   Lymphadenopathy:      Cervical: No cervical adenopathy.   Skin:     General: Skin is warm and dry.   Neurological:      General: No focal deficit present.         Assessment/Plan  Problem List Items Addressed This Visit             ICD-10-CM    Atrial fibrillation with RVR (Multi) I48.91    Acute on chronic diastolic congestive heart failure (Multi) I50.33    Mixed hyperlipidemia - Primary E78.2    Primary hypertension I10       Cbc cmp lipids ordered for later this month  wts back up this month likely due to edema fluctuating   Goals    None           Electronically Signed By: Kady Conti MD   8/5/24  1:07 PM

## 2024-08-05 ASSESSMENT — ENCOUNTER SYMPTOMS
DIARRHEA: 0
CONFUSION: 1
HEADACHES: 0
HEMATURIA: 0
CHILLS: 0
VOMITING: 0
ABDOMINAL PAIN: 0
CONSTIPATION: 0
FEVER: 0
SORE THROAT: 0
COUGH: 0
DYSURIA: 0
DIZZINESS: 0
NAUSEA: 0
FREQUENCY: 0
WHEEZING: 0
PALPITATIONS: 0
DYSPHORIC MOOD: 0
SHORTNESS OF BREATH: 0
NERVOUS/ANXIOUS: 0
FATIGUE: 1

## 2024-08-05 NOTE — PROGRESS NOTES
Subjective   Patient ID: Olga Joyce is a 59 y.o. female who is seen for long term care being seen and evaluated for multiple medical problems.    HPI  she continues to need adl assistance and labs from march reviewed with rechecks due and we will get those ordered. She has ongoing chronic lymphedema.  She is doing well since coming back to the facility.   Headaches better. She is due for labs and ordered for next week.         Review of Systems   Constitutional:  Positive for fatigue. Negative for chills and fever.   HENT:  Negative for congestion, hearing loss and sore throat.    Eyes:  Negative for visual disturbance.   Respiratory:  Negative for cough, shortness of breath and wheezing.    Cardiovascular:  Positive for leg swelling. Negative for chest pain and palpitations.   Gastrointestinal:  Negative for abdominal pain, constipation, diarrhea, nausea and vomiting.   Genitourinary:  Negative for dysuria, frequency, hematuria and urgency.   Musculoskeletal:  Positive for gait problem.   Skin:  Negative for rash.   Neurological:  Negative for dizziness, syncope and headaches.   Psychiatric/Behavioral:  Positive for confusion. Negative for dysphoric mood. The patient is not nervous/anxious.        Objective   There were no vitals taken for this visit.    Physical Exam  Vitals reviewed: 112/68 62 97.6 18 wt 355-up 4 lbs.   Constitutional:       General: She is not in acute distress.     Appearance: She is not ill-appearing.   HENT:      Head: Normocephalic.      Nose: No congestion.      Mouth/Throat:      Mouth: Mucous membranes are dry.      Pharynx: Oropharynx is clear.   Eyes:      General: No scleral icterus.     Conjunctiva/sclera: Conjunctivae normal.   Cardiovascular:      Rate and Rhythm: Normal rate and regular rhythm.      Heart sounds:      No gallop.   Pulmonary:      Effort: Pulmonary effort is normal.      Breath sounds: No wheezing or rhonchi.   Abdominal:      General: Bowel sounds are normal.       Tenderness: There is no abdominal tenderness. There is no rebound.   Musculoskeletal:      Cervical back: Neck supple.      Right lower leg: Edema present.      Left lower leg: Edema present.   Lymphadenopathy:      Cervical: No cervical adenopathy.   Skin:     General: Skin is warm and dry.   Neurological:      General: No focal deficit present.         Assessment/Plan   Problem List Items Addressed This Visit             ICD-10-CM    Atrial fibrillation with RVR (Multi) I48.91    Acute on chronic diastolic congestive heart failure (Multi) I50.33    Mixed hyperlipidemia - Primary E78.2    Primary hypertension I10       Cbc cmp lipids ordered for later this month  wts back up this month likely due to edema fluctuating   Goals    None

## 2024-09-06 ENCOUNTER — NURSING HOME VISIT (OUTPATIENT)
Dept: POST ACUTE CARE | Facility: EXTERNAL LOCATION | Age: 59
End: 2024-09-06
Payer: MEDICAID

## 2024-09-06 DIAGNOSIS — I89.0 LYMPHEDEMA: ICD-10-CM

## 2024-09-06 DIAGNOSIS — I10 PRIMARY HYPERTENSION: ICD-10-CM

## 2024-09-06 DIAGNOSIS — R79.89 ELEVATED TSH: ICD-10-CM

## 2024-09-06 DIAGNOSIS — I50.33 ACUTE ON CHRONIC DIASTOLIC CONGESTIVE HEART FAILURE (MULTI): Primary | ICD-10-CM

## 2024-09-06 DIAGNOSIS — I48.91 ATRIAL FIBRILLATION WITH RVR (MULTI): ICD-10-CM

## 2024-09-06 PROCEDURE — 99309 SBSQ NF CARE MODERATE MDM 30: CPT | Performed by: FAMILY MEDICINE

## 2024-09-06 NOTE — LETTER
Patient: Olga Joyce  : 1965    Encounter Date: 2024    Subjective  Patient ID: Olga Joyce is a 59 y.o. female who is seen for long term care being seen and evaluated for multiple medical problems.    HPI  she continues to need adl assistance and labs from march reviewed with rechecks due and we will get those ordered. She has ongoing chronic lymphedema.  She had high tsh and recheck with t4 needed denny with weight gain. Chol 218 in August.         Review of Systems   Constitutional:  Positive for fatigue. Negative for chills and fever.   HENT:  Negative for congestion, hearing loss and sore throat.    Eyes:  Negative for visual disturbance.   Respiratory:  Negative for cough, shortness of breath and wheezing.    Cardiovascular:  Positive for leg swelling. Negative for chest pain and palpitations.   Gastrointestinal:  Negative for abdominal pain, constipation, diarrhea, nausea and vomiting.   Genitourinary:  Negative for dysuria, frequency, hematuria and urgency.   Musculoskeletal:  Positive for gait problem.   Skin:  Negative for rash.   Neurological:  Negative for dizziness, syncope and headaches.   Psychiatric/Behavioral:  Positive for confusion. Negative for dysphoric mood. The patient is not nervous/anxious.        Objective  There were no vitals taken for this visit.    Physical Exam  Vitals reviewed: 126/72 98 69 wt 362-up 7 lbs.   Constitutional:       General: She is not in acute distress.     Appearance: She is not ill-appearing.   HENT:      Head: Normocephalic.      Nose: No congestion.      Mouth/Throat:      Mouth: Mucous membranes are dry.      Pharynx: Oropharynx is clear.   Eyes:      General: No scleral icterus.     Conjunctiva/sclera: Conjunctivae normal.   Cardiovascular:      Rate and Rhythm: Normal rate and regular rhythm.      Heart sounds:      No gallop.   Pulmonary:      Effort: Pulmonary effort is normal.      Breath sounds: No wheezing or rhonchi.   Abdominal:       General: Bowel sounds are normal.      Tenderness: There is no abdominal tenderness. There is no rebound.   Musculoskeletal:      Cervical back: Neck supple.      Right lower leg: Edema present.      Left lower leg: Edema present.   Lymphadenopathy:      Cervical: No cervical adenopathy.   Skin:     General: Skin is warm and dry.   Neurological:      General: No focal deficit present.         Assessment/Plan  Problem List Items Addressed This Visit             ICD-10-CM    Lymphedema I89.0    Atrial fibrillation with RVR (Multi) I48.91    Acute on chronic diastolic congestive heart failure (Multi) - Primary I50.33    Primary hypertension I10     Other Visit Diagnoses         Codes    Elevated TSH     R79.89            Tsh/t4 next week, may be factor in edema and wt gain      Goals    None           Electronically Signed By: Kady Conti MD   9/9/24  3:06 PM

## 2024-09-09 ASSESSMENT — ENCOUNTER SYMPTOMS
SHORTNESS OF BREATH: 0
VOMITING: 0
SORE THROAT: 0
WHEEZING: 0
COUGH: 0
CHILLS: 0
HEMATURIA: 0
CONSTIPATION: 0
NAUSEA: 0
DYSURIA: 0
DIARRHEA: 0
DYSPHORIC MOOD: 0
FATIGUE: 1
ABDOMINAL PAIN: 0
PALPITATIONS: 0
DIZZINESS: 0
NERVOUS/ANXIOUS: 0
CONFUSION: 1
FEVER: 0
FREQUENCY: 0
HEADACHES: 0

## 2024-09-09 NOTE — PROGRESS NOTES
Subjective   Patient ID: Olga Joyce is a 59 y.o. female who is seen for long term care being seen and evaluated for multiple medical problems.    HPI  she continues to need adl assistance and labs from march reviewed with rechecks due and we will get those ordered. She has ongoing chronic lymphedema.  She had high tsh and recheck with t4 needed denny with weight gain. Chol 218 in August.         Review of Systems   Constitutional:  Positive for fatigue. Negative for chills and fever.   HENT:  Negative for congestion, hearing loss and sore throat.    Eyes:  Negative for visual disturbance.   Respiratory:  Negative for cough, shortness of breath and wheezing.    Cardiovascular:  Positive for leg swelling. Negative for chest pain and palpitations.   Gastrointestinal:  Negative for abdominal pain, constipation, diarrhea, nausea and vomiting.   Genitourinary:  Negative for dysuria, frequency, hematuria and urgency.   Musculoskeletal:  Positive for gait problem.   Skin:  Negative for rash.   Neurological:  Negative for dizziness, syncope and headaches.   Psychiatric/Behavioral:  Positive for confusion. Negative for dysphoric mood. The patient is not nervous/anxious.        Objective   There were no vitals taken for this visit.    Physical Exam  Vitals reviewed: 126/72 98 69 wt 362-up 7 lbs.   Constitutional:       General: She is not in acute distress.     Appearance: She is not ill-appearing.   HENT:      Head: Normocephalic.      Nose: No congestion.      Mouth/Throat:      Mouth: Mucous membranes are dry.      Pharynx: Oropharynx is clear.   Eyes:      General: No scleral icterus.     Conjunctiva/sclera: Conjunctivae normal.   Cardiovascular:      Rate and Rhythm: Normal rate and regular rhythm.      Heart sounds:      No gallop.   Pulmonary:      Effort: Pulmonary effort is normal.      Breath sounds: No wheezing or rhonchi.   Abdominal:      General: Bowel sounds are normal.      Tenderness: There is no abdominal  tenderness. There is no rebound.   Musculoskeletal:      Cervical back: Neck supple.      Right lower leg: Edema present.      Left lower leg: Edema present.   Lymphadenopathy:      Cervical: No cervical adenopathy.   Skin:     General: Skin is warm and dry.   Neurological:      General: No focal deficit present.         Assessment/Plan   Problem List Items Addressed This Visit             ICD-10-CM    Lymphedema I89.0    Atrial fibrillation with RVR (Multi) I48.91    Acute on chronic diastolic congestive heart failure (Multi) - Primary I50.33    Primary hypertension I10     Other Visit Diagnoses         Codes    Elevated TSH     R79.89            Tsh/t4 next week, may be factor in edema and wt gain      Goals    None

## 2024-10-04 ENCOUNTER — NURSING HOME VISIT (OUTPATIENT)
Dept: POST ACUTE CARE | Facility: EXTERNAL LOCATION | Age: 59
End: 2024-10-04
Payer: MEDICAID

## 2024-10-04 DIAGNOSIS — I48.91 ATRIAL FIBRILLATION WITH RVR (MULTI): ICD-10-CM

## 2024-10-04 DIAGNOSIS — I89.0 LYMPHEDEMA: Primary | ICD-10-CM

## 2024-10-04 DIAGNOSIS — E87.6 HYPOKALEMIA: ICD-10-CM

## 2024-10-04 DIAGNOSIS — E03.9 ACQUIRED HYPOTHYROIDISM: ICD-10-CM

## 2024-10-04 PROCEDURE — 99309 SBSQ NF CARE MODERATE MDM 30: CPT | Performed by: FAMILY MEDICINE

## 2024-10-04 NOTE — LETTER
Patient: Olga Joyce  : 1965    Encounter Date: 10/04/2024    Subjective  Patient ID: Olga Joyce is a 59 y.o. female who is seen for long term care being seen and evaluated for multiple medical problems.    HPI  she continues to need adl assistance and labs from August reviewed with stable cbc and cmp noted. Lymphedema continues. Thyroid recheck ordered nd pending. Staff reports no new concerns.          Review of Systems   Constitutional:  Positive for fatigue. Negative for chills and fever.   HENT:  Negative for congestion, hearing loss and sore throat.    Eyes:  Negative for visual disturbance.   Respiratory:  Negative for cough, shortness of breath and wheezing.    Cardiovascular:  Positive for leg swelling. Negative for chest pain and palpitations.   Gastrointestinal:  Negative for abdominal pain, constipation, diarrhea, nausea and vomiting.   Genitourinary:  Negative for dysuria, frequency, hematuria and urgency.   Musculoskeletal:  Positive for gait problem.   Skin:  Negative for rash.   Neurological:  Negative for dizziness, syncope and headaches.   Psychiatric/Behavioral:  Positive for confusion. Negative for dysphoric mood. The patient is not nervous/anxious.        Objective  There were no vitals taken for this visit.    Physical Exam  Vitals reviewed: 134/72 97.6 72 wt 365-up 3 more lbs.   Constitutional:       General: She is not in acute distress.     Appearance: She is not ill-appearing.   HENT:      Head: Normocephalic.      Nose: No congestion.      Mouth/Throat:      Mouth: Mucous membranes are dry.      Pharynx: Oropharynx is clear.   Eyes:      General: No scleral icterus.     Conjunctiva/sclera: Conjunctivae normal.   Cardiovascular:      Rate and Rhythm: Normal rate and regular rhythm.      Heart sounds:      No gallop.   Pulmonary:      Effort: Pulmonary effort is normal.      Breath sounds: No wheezing or rhonchi.   Abdominal:      General: Bowel sounds are normal.       Tenderness: There is no abdominal tenderness. There is no rebound.   Musculoskeletal:      Cervical back: Neck supple.      Right lower leg: Edema present.      Left lower leg: Edema present.   Lymphadenopathy:      Cervical: No cervical adenopathy.   Skin:     General: Skin is warm and dry.   Neurological:      General: No focal deficit present.         Assessment/Plan  Problem List Items Addressed This Visit             ICD-10-CM    Lymphedema - Primary I89.0    Atrial fibrillation with RVR (Multi) I48.91     Other Visit Diagnoses         Codes    Acquired hypothyroidism     E03.9          Adjusted synthroid dose, and recheck thryoid function ordered  Consider short term increase in diuretics as edema is worse however she lies around all day as well then recheck bmp due to history of low potassium    Tsh/t4 next week, may be factor in edema and wt gain      Goals    None           Electronically Signed By: Kady Conti MD   10/7/24  7:08 PM

## 2024-10-07 ASSESSMENT — ENCOUNTER SYMPTOMS
PALPITATIONS: 0
NERVOUS/ANXIOUS: 0
HEMATURIA: 0
SHORTNESS OF BREATH: 0
CONFUSION: 1
FATIGUE: 1
FEVER: 0
VOMITING: 0
NAUSEA: 0
COUGH: 0
CONSTIPATION: 0
HEADACHES: 0
CHILLS: 0
SORE THROAT: 0
DYSURIA: 0
FREQUENCY: 0
DIARRHEA: 0
WHEEZING: 0
ABDOMINAL PAIN: 0
DIZZINESS: 0
DYSPHORIC MOOD: 0

## 2024-10-07 NOTE — PROGRESS NOTES
Subjective   Patient ID: Olga Joyce is a 59 y.o. female who is seen for long term care being seen and evaluated for multiple medical problems.    HPI  she continues to need adl assistance and labs from August reviewed with stable cbc and cmp noted. Lymphedema continues. Thyroid recheck ordered nd pending. Staff reports no new concerns.          Review of Systems   Constitutional:  Positive for fatigue. Negative for chills and fever.   HENT:  Negative for congestion, hearing loss and sore throat.    Eyes:  Negative for visual disturbance.   Respiratory:  Negative for cough, shortness of breath and wheezing.    Cardiovascular:  Positive for leg swelling. Negative for chest pain and palpitations.   Gastrointestinal:  Negative for abdominal pain, constipation, diarrhea, nausea and vomiting.   Genitourinary:  Negative for dysuria, frequency, hematuria and urgency.   Musculoskeletal:  Positive for gait problem.   Skin:  Negative for rash.   Neurological:  Negative for dizziness, syncope and headaches.   Psychiatric/Behavioral:  Positive for confusion. Negative for dysphoric mood. The patient is not nervous/anxious.        Objective   There were no vitals taken for this visit.    Physical Exam  Vitals reviewed: 134/72 97.6 72 wt 365-up 3 more lbs.   Constitutional:       General: She is not in acute distress.     Appearance: She is not ill-appearing.   HENT:      Head: Normocephalic.      Nose: No congestion.      Mouth/Throat:      Mouth: Mucous membranes are dry.      Pharynx: Oropharynx is clear.   Eyes:      General: No scleral icterus.     Conjunctiva/sclera: Conjunctivae normal.   Cardiovascular:      Rate and Rhythm: Normal rate and regular rhythm.      Heart sounds:      No gallop.   Pulmonary:      Effort: Pulmonary effort is normal.      Breath sounds: No wheezing or rhonchi.   Abdominal:      General: Bowel sounds are normal.      Tenderness: There is no abdominal tenderness. There is no rebound.    Musculoskeletal:      Cervical back: Neck supple.      Right lower leg: Edema present.      Left lower leg: Edema present.   Lymphadenopathy:      Cervical: No cervical adenopathy.   Skin:     General: Skin is warm and dry.   Neurological:      General: No focal deficit present.         Assessment/Plan   Problem List Items Addressed This Visit             ICD-10-CM    Lymphedema - Primary I89.0    Atrial fibrillation with RVR (Multi) I48.91     Other Visit Diagnoses         Codes    Acquired hypothyroidism     E03.9          Adjusted synthroid dose, and recheck thryoid function ordered  Consider short term increase in diuretics as edema is worse however she lies around all day as well then recheck bmp due to history of low potassium    Tsh/t4 next week, may be factor in edema and wt gain      Goals    None

## 2024-11-15 ENCOUNTER — NURSING HOME VISIT (OUTPATIENT)
Dept: POST ACUTE CARE | Facility: EXTERNAL LOCATION | Age: 59
End: 2024-11-15
Payer: MEDICAID

## 2024-11-15 DIAGNOSIS — E03.9 ACQUIRED HYPOTHYROIDISM: Primary | ICD-10-CM

## 2024-11-15 DIAGNOSIS — E78.5 DYSLIPIDEMIA: ICD-10-CM

## 2024-11-15 DIAGNOSIS — I50.33 ACUTE ON CHRONIC DIASTOLIC CONGESTIVE HEART FAILURE: ICD-10-CM

## 2024-11-15 DIAGNOSIS — I10 PRIMARY HYPERTENSION: ICD-10-CM

## 2024-11-15 PROCEDURE — 99309 SBSQ NF CARE MODERATE MDM 30: CPT | Performed by: FAMILY MEDICINE

## 2024-11-15 NOTE — LETTER
Patient: Olga Jyoce  : 1965    Encounter Date: 11/15/2024    Subjective  Patient ID: Olga Joyce is a 59 y.o. female who is seen for long term care being seen and evaluated for multiple medical problems.    HPI she is doing fairly well since recovering from coronavirus.  Laboratory studies were done in August and September and most recently showed that her thyroid function showed a low T3 but normal T4.  I consider adding a low-dose of Cytomel if insurance would not cover [place] Thyroid.  Will continue to monitor weights and edema as certainly adjusting her thyroid function may help with both of those issues.            Review of Systems   Constitutional:  Positive for fatigue. Negative for chills and fever.   HENT:  Negative for congestion, hearing loss and sore throat.    Eyes:  Negative for visual disturbance.   Respiratory:  Negative for cough, shortness of breath and wheezing.    Cardiovascular:  Positive for leg swelling. Negative for chest pain and palpitations.   Gastrointestinal:  Negative for abdominal pain, constipation, diarrhea, nausea and vomiting.   Genitourinary:  Negative for dysuria, frequency, hematuria and urgency.   Musculoskeletal:  Positive for gait problem.   Skin:  Negative for rash.   Neurological:  Negative for dizziness, syncope and headaches.   Psychiatric/Behavioral:  Positive for confusion. Negative for dysphoric mood. The patient is not nervous/anxious.        Objective  There were no vitals taken for this visit.    Physical Exam  Vitals reviewed: 130/81 temp 97.9 pulse 61 weight 365 which is October his weight and no weight yet taken for November we will need to track that down.   Constitutional:       General: She is not in acute distress.     Appearance: She is not ill-appearing.   HENT:      Head: Normocephalic.      Nose: No congestion.      Mouth/Throat:      Mouth: Mucous membranes are dry.      Pharynx: Oropharynx is clear.   Eyes:      General: No scleral  icterus.     Conjunctiva/sclera: Conjunctivae normal.   Cardiovascular:      Rate and Rhythm: Normal rate and regular rhythm.      Heart sounds:      No gallop.   Pulmonary:      Effort: Pulmonary effort is normal.      Breath sounds: No wheezing or rhonchi.   Abdominal:      General: Bowel sounds are normal.      Tenderness: There is no abdominal tenderness. There is no rebound.   Musculoskeletal:      Cervical back: Neck supple.      Right lower leg: Edema present.      Left lower leg: Edema present.   Lymphadenopathy:      Cervical: No cervical adenopathy.   Skin:     General: Skin is warm and dry.   Neurological:      General: No focal deficit present.         Assessment/Plan  Problem List Items Addressed This Visit             ICD-10-CM    Acute on chronic diastolic congestive heart failure I50.33    Dyslipidemia E78.5    Primary hypertension I10     Other Visit Diagnoses         Codes    Acquired hypothyroidism    -  Primary E03.9          She has ongoing edema but no worsening shortness of breath and pulse ox stable.  Will need a recheck of her weight.  Consider adding a low-dose of T3 to see if this assists with her weight management as well as edema.  T4 is in range with the adjustment in the Synthroid dose.  Potassium has remained stable.   Goals    None           Electronically Signed By: Kady Conti MD   11/17/24  8:57 PM

## 2024-11-17 ASSESSMENT — ENCOUNTER SYMPTOMS
VOMITING: 0
NERVOUS/ANXIOUS: 0
HEMATURIA: 0
DYSPHORIC MOOD: 0
WHEEZING: 0
FREQUENCY: 0
NAUSEA: 0
CONFUSION: 1
COUGH: 0
ABDOMINAL PAIN: 0
FATIGUE: 1
DIZZINESS: 0
FEVER: 0
DIARRHEA: 0
CHILLS: 0
CONSTIPATION: 0
HEADACHES: 0
DYSURIA: 0
SHORTNESS OF BREATH: 0
SORE THROAT: 0
PALPITATIONS: 0

## 2024-11-18 NOTE — PROGRESS NOTES
Subjective   Patient ID: Olga Joyce is a 59 y.o. female who is seen for long term care being seen and evaluated for multiple medical problems.    HPI she is doing fairly well since recovering from coronavirus.  Laboratory studies were done in August and September and most recently showed that her thyroid function showed a low T3 but normal T4.  I consider adding a low-dose of Cytomel if insurance would not cover [place] Thyroid.  Will continue to monitor weights and edema as certainly adjusting her thyroid function may help with both of those issues.            Review of Systems   Constitutional:  Positive for fatigue. Negative for chills and fever.   HENT:  Negative for congestion, hearing loss and sore throat.    Eyes:  Negative for visual disturbance.   Respiratory:  Negative for cough, shortness of breath and wheezing.    Cardiovascular:  Positive for leg swelling. Negative for chest pain and palpitations.   Gastrointestinal:  Negative for abdominal pain, constipation, diarrhea, nausea and vomiting.   Genitourinary:  Negative for dysuria, frequency, hematuria and urgency.   Musculoskeletal:  Positive for gait problem.   Skin:  Negative for rash.   Neurological:  Negative for dizziness, syncope and headaches.   Psychiatric/Behavioral:  Positive for confusion. Negative for dysphoric mood. The patient is not nervous/anxious.        Objective   There were no vitals taken for this visit.    Physical Exam  Vitals reviewed: 130/81 temp 97.9 pulse 61 weight 365 which is October his weight and no weight yet taken for November we will need to track that down.   Constitutional:       General: She is not in acute distress.     Appearance: She is not ill-appearing.   HENT:      Head: Normocephalic.      Nose: No congestion.      Mouth/Throat:      Mouth: Mucous membranes are dry.      Pharynx: Oropharynx is clear.   Eyes:      General: No scleral icterus.     Conjunctiva/sclera: Conjunctivae normal.   Cardiovascular:       Rate and Rhythm: Normal rate and regular rhythm.      Heart sounds:      No gallop.   Pulmonary:      Effort: Pulmonary effort is normal.      Breath sounds: No wheezing or rhonchi.   Abdominal:      General: Bowel sounds are normal.      Tenderness: There is no abdominal tenderness. There is no rebound.   Musculoskeletal:      Cervical back: Neck supple.      Right lower leg: Edema present.      Left lower leg: Edema present.   Lymphadenopathy:      Cervical: No cervical adenopathy.   Skin:     General: Skin is warm and dry.   Neurological:      General: No focal deficit present.         Assessment/Plan   Problem List Items Addressed This Visit             ICD-10-CM    Acute on chronic diastolic congestive heart failure I50.33    Dyslipidemia E78.5    Primary hypertension I10     Other Visit Diagnoses         Codes    Acquired hypothyroidism    -  Primary E03.9          She has ongoing edema but no worsening shortness of breath and pulse ox stable.  Will need a recheck of her weight.  Consider adding a low-dose of T3 to see if this assists with her weight management as well as edema.  T4 is in range with the adjustment in the Synthroid dose.  Potassium has remained stable.   Goals    None

## 2024-12-20 ENCOUNTER — NURSING HOME VISIT (OUTPATIENT)
Dept: POST ACUTE CARE | Facility: EXTERNAL LOCATION | Age: 59
End: 2024-12-20
Payer: MEDICAID

## 2024-12-20 DIAGNOSIS — E87.6 HYPOKALEMIA: Primary | ICD-10-CM

## 2024-12-20 DIAGNOSIS — E78.2 MIXED HYPERLIPIDEMIA: ICD-10-CM

## 2024-12-20 DIAGNOSIS — I48.91 ATRIAL FIBRILLATION WITH RVR (MULTI): ICD-10-CM

## 2024-12-20 DIAGNOSIS — I89.0 LYMPHEDEMA: ICD-10-CM

## 2024-12-20 PROCEDURE — 99308 SBSQ NF CARE LOW MDM 20: CPT | Performed by: FAMILY MEDICINE

## 2024-12-20 NOTE — LETTER
Patient: Olga Joyce  : 1965    Encounter Date: 2024    Subjective  Patient ID: Olga Joyce is a 59 y.o. female who is seen for long term care being seen and evaluated for multiple medical problems.    HPI she is doing fairly well since recovering from coronavirus.  Laboratory studies were done in August and most recently showed that her thyroid function showed a low T3 but normal T4.  Rechecks we due in February.  He offers no acute complaints today.  Lymphedema continues to be an issue but weight is overall down this month.              Review of Systems   Constitutional:  Positive for fatigue. Negative for chills and fever.   HENT:  Negative for congestion, hearing loss and sore throat.    Eyes:  Negative for visual disturbance.   Respiratory:  Negative for cough, shortness of breath and wheezing.    Cardiovascular:  Positive for leg swelling. Negative for chest pain and palpitations.   Gastrointestinal:  Negative for abdominal pain, constipation, diarrhea, nausea and vomiting.   Genitourinary:  Negative for dysuria, frequency, hematuria and urgency.   Musculoskeletal:  Positive for gait problem.   Skin:  Negative for rash.   Neurological:  Negative for dizziness, syncope and headaches.   Psychiatric/Behavioral:  Positive for confusion. Negative for dysphoric mood. The patient is not nervous/anxious.        Objective  There were no vitals taken for this visit.    Physical Exam  Vitals reviewed: 131/76 temp 98.1 pulse 80 weight 362 which is down 3 pounds.   Constitutional:       General: She is not in acute distress.     Appearance: She is not ill-appearing.   HENT:      Head: Normocephalic.      Nose: No congestion.      Mouth/Throat:      Mouth: Mucous membranes are dry.      Pharynx: Oropharynx is clear.   Eyes:      General: No scleral icterus.     Conjunctiva/sclera: Conjunctivae normal.   Cardiovascular:      Rate and Rhythm: Normal rate and regular rhythm.      Heart sounds:      No  gallop.   Pulmonary:      Effort: Pulmonary effort is normal.      Breath sounds: No wheezing or rhonchi.   Abdominal:      General: Bowel sounds are normal.      Tenderness: There is no abdominal tenderness. There is no rebound.   Musculoskeletal:      Cervical back: Neck supple.      Right lower leg: Edema present.      Left lower leg: Edema present.   Lymphadenopathy:      Cervical: No cervical adenopathy.   Skin:     General: Skin is warm and dry.   Neurological:      General: No focal deficit present.         Assessment/Plan  Problem List Items Addressed This Visit             ICD-10-CM    Lymphedema I89.0    Atrial fibrillation with RVR (Multi) I48.91    Mixed hyperlipidemia E78.2    Hypokalemia - Primary E87.6     CBC CMP TSH T4 T3 and lipid panel due in February.  Potassium has remained stable and no complaints of myalgias.  Lymphedema chronic but weight is down this month.   Goals    None           Electronically Signed By: Kady Conti MD   12/23/24 12:23 PM

## 2024-12-23 ASSESSMENT — ENCOUNTER SYMPTOMS
COUGH: 0
DIZZINESS: 0
FREQUENCY: 0
FATIGUE: 1
SHORTNESS OF BREATH: 0
CHILLS: 0
DIARRHEA: 0
ABDOMINAL PAIN: 0
CONSTIPATION: 0
CONFUSION: 1
HEMATURIA: 0
HEADACHES: 0
DYSPHORIC MOOD: 0
VOMITING: 0
NERVOUS/ANXIOUS: 0
PALPITATIONS: 0
NAUSEA: 0
DYSURIA: 0
SORE THROAT: 0
FEVER: 0
WHEEZING: 0

## 2024-12-23 NOTE — PROGRESS NOTES
Subjective   Patient ID: Olga Joyec is a 59 y.o. female who is seen for long term care being seen and evaluated for multiple medical problems.    HPI she is doing fairly well since recovering from coronavirus.  Laboratory studies were done in August and most recently showed that her thyroid function showed a low T3 but normal T4.  Rechecks we due in February.  He offers no acute complaints today.  Lymphedema continues to be an issue but weight is overall down this month.              Review of Systems   Constitutional:  Positive for fatigue. Negative for chills and fever.   HENT:  Negative for congestion, hearing loss and sore throat.    Eyes:  Negative for visual disturbance.   Respiratory:  Negative for cough, shortness of breath and wheezing.    Cardiovascular:  Positive for leg swelling. Negative for chest pain and palpitations.   Gastrointestinal:  Negative for abdominal pain, constipation, diarrhea, nausea and vomiting.   Genitourinary:  Negative for dysuria, frequency, hematuria and urgency.   Musculoskeletal:  Positive for gait problem.   Skin:  Negative for rash.   Neurological:  Negative for dizziness, syncope and headaches.   Psychiatric/Behavioral:  Positive for confusion. Negative for dysphoric mood. The patient is not nervous/anxious.        Objective   There were no vitals taken for this visit.    Physical Exam  Vitals reviewed: 131/76 temp 98.1 pulse 80 weight 362 which is down 3 pounds.   Constitutional:       General: She is not in acute distress.     Appearance: She is not ill-appearing.   HENT:      Head: Normocephalic.      Nose: No congestion.      Mouth/Throat:      Mouth: Mucous membranes are dry.      Pharynx: Oropharynx is clear.   Eyes:      General: No scleral icterus.     Conjunctiva/sclera: Conjunctivae normal.   Cardiovascular:      Rate and Rhythm: Normal rate and regular rhythm.      Heart sounds:      No gallop.   Pulmonary:      Effort: Pulmonary effort is normal.      Breath  sounds: No wheezing or rhonchi.   Abdominal:      General: Bowel sounds are normal.      Tenderness: There is no abdominal tenderness. There is no rebound.   Musculoskeletal:      Cervical back: Neck supple.      Right lower leg: Edema present.      Left lower leg: Edema present.   Lymphadenopathy:      Cervical: No cervical adenopathy.   Skin:     General: Skin is warm and dry.   Neurological:      General: No focal deficit present.         Assessment/Plan   Problem List Items Addressed This Visit             ICD-10-CM    Lymphedema I89.0    Atrial fibrillation with RVR (Multi) I48.91    Mixed hyperlipidemia E78.2    Hypokalemia - Primary E87.6     CBC CMP TSH T4 T3 and lipid panel due in February.  Potassium has remained stable and no complaints of myalgias.  Lymphedema chronic but weight is down this month.   Goals    None

## 2025-01-24 ENCOUNTER — NURSING HOME VISIT (OUTPATIENT)
Dept: POST ACUTE CARE | Facility: EXTERNAL LOCATION | Age: 60
End: 2025-01-24
Payer: MEDICAID

## 2025-01-24 DIAGNOSIS — G89.29 CHRONIC PAIN OF LEFT KNEE: Primary | ICD-10-CM

## 2025-01-24 DIAGNOSIS — M25.562 CHRONIC PAIN OF LEFT KNEE: Primary | ICD-10-CM

## 2025-01-24 DIAGNOSIS — M25.512 CHRONIC LEFT SHOULDER PAIN: ICD-10-CM

## 2025-01-24 DIAGNOSIS — G89.29 CHRONIC LEFT SHOULDER PAIN: ICD-10-CM

## 2025-01-24 DIAGNOSIS — E87.6 HYPOKALEMIA: ICD-10-CM

## 2025-01-24 DIAGNOSIS — E78.2 MIXED HYPERLIPIDEMIA: ICD-10-CM

## 2025-01-24 DIAGNOSIS — I10 PRIMARY HYPERTENSION: ICD-10-CM

## 2025-01-24 DIAGNOSIS — K21.9 GASTROESOPHAGEAL REFLUX DISEASE WITHOUT ESOPHAGITIS: ICD-10-CM

## 2025-01-24 PROCEDURE — 99309 SBSQ NF CARE MODERATE MDM 30: CPT | Performed by: FAMILY MEDICINE

## 2025-01-24 NOTE — LETTER
Patient: Olga Joyce  : 1965    Encounter Date: 2025    Subjective  Patient ID: Olga Joyce is a 59 y.o. female who is seen for long term care being seen and evaluated for multiple medical problems.    HPI she is doing fairly well since recovering from coronavirus.  Laboratory studies were done in August and most recently showed that her thyroid function showed a low T3 but normal T4.  Recheck labs due. She has complaints of knee and shoulder pain today. Tylenol not helping.         Review of Systems   Constitutional:  Positive for fatigue. Negative for chills and fever.   HENT:  Negative for congestion, hearing loss and sore throat.    Eyes:  Negative for visual disturbance.   Respiratory:  Negative for cough, shortness of breath and wheezing.    Cardiovascular:  Positive for leg swelling. Negative for chest pain and palpitations.   Gastrointestinal:  Negative for abdominal pain, constipation, diarrhea, nausea and vomiting.   Genitourinary:  Negative for dysuria, frequency, hematuria and urgency.   Musculoskeletal:  Positive for arthralgias and gait problem.   Skin:  Negative for rash.   Neurological:  Negative for dizziness, syncope and headaches.   Psychiatric/Behavioral:  Positive for confusion. Negative for dysphoric mood. The patient is not nervous/anxious.        Objective  There were no vitals taken for this visit.    Physical Exam  Vitals reviewed: 122/76 81 97.8 wt 360-down 2 more lbs.   Constitutional:       General: She is not in acute distress.     Appearance: She is not ill-appearing.   HENT:      Head: Normocephalic.      Nose: No congestion.      Mouth/Throat:      Mouth: Mucous membranes are dry.      Pharynx: Oropharynx is clear.   Eyes:      General: No scleral icterus.     Conjunctiva/sclera: Conjunctivae normal.   Cardiovascular:      Rate and Rhythm: Normal rate and regular rhythm.      Heart sounds:      No gallop.   Pulmonary:      Effort: Pulmonary effort is normal.       Breath sounds: No wheezing or rhonchi.   Abdominal:      General: Bowel sounds are normal.      Tenderness: There is no abdominal tenderness. There is no rebound.   Musculoskeletal:      Cervical back: Neck supple.      Right lower leg: Edema present.      Left lower leg: Edema present.   Lymphadenopathy:      Cervical: No cervical adenopathy.   Skin:     General: Skin is warm and dry.   Neurological:      General: No focal deficit present.         Assessment/Plan  Problem List Items Addressed This Visit             ICD-10-CM    GERD (gastroesophageal reflux disease) K21.9    Mixed hyperlipidemia E78.2    Hypokalemia E87.6    Primary hypertension I10     Other Visit Diagnoses         Codes    Chronic pain of left knee    -  Primary M25.562, G89.29    Chronic left shoulder pain     M25.512, G89.29            CBC CMP TSH T4 T3 and lipid panel due in February.  Potassium has remained stable and no complaints of myalgias. Consider low dose gabapentin for joint pain.    Goals    None           Electronically Signed By: Kady Conti MD   1/26/25  8:24 PM

## 2025-01-26 ASSESSMENT — ENCOUNTER SYMPTOMS
DIARRHEA: 0
NAUSEA: 0
FEVER: 0
CHILLS: 0
CONFUSION: 1
CONSTIPATION: 0
ABDOMINAL PAIN: 0
DIZZINESS: 0
ARTHRALGIAS: 1
HEADACHES: 0
PALPITATIONS: 0
DYSURIA: 0
WHEEZING: 0
HEMATURIA: 0
SORE THROAT: 0
SHORTNESS OF BREATH: 0
VOMITING: 0
NERVOUS/ANXIOUS: 0
COUGH: 0
FREQUENCY: 0
FATIGUE: 1
DYSPHORIC MOOD: 0

## 2025-01-27 NOTE — PROGRESS NOTES
Subjective   Patient ID: Olga Joyce is a 59 y.o. female who is seen for long term care being seen and evaluated for multiple medical problems.    HPI she is doing fairly well since recovering from coronavirus.  Laboratory studies were done in August and most recently showed that her thyroid function showed a low T3 but normal T4.  Recheck labs due. She has complaints of knee and shoulder pain today. Tylenol not helping.         Review of Systems   Constitutional:  Positive for fatigue. Negative for chills and fever.   HENT:  Negative for congestion, hearing loss and sore throat.    Eyes:  Negative for visual disturbance.   Respiratory:  Negative for cough, shortness of breath and wheezing.    Cardiovascular:  Positive for leg swelling. Negative for chest pain and palpitations.   Gastrointestinal:  Negative for abdominal pain, constipation, diarrhea, nausea and vomiting.   Genitourinary:  Negative for dysuria, frequency, hematuria and urgency.   Musculoskeletal:  Positive for arthralgias and gait problem.   Skin:  Negative for rash.   Neurological:  Negative for dizziness, syncope and headaches.   Psychiatric/Behavioral:  Positive for confusion. Negative for dysphoric mood. The patient is not nervous/anxious.        Objective   There were no vitals taken for this visit.    Physical Exam  Vitals reviewed: 122/76 81 97.8 wt 360-down 2 more lbs.   Constitutional:       General: She is not in acute distress.     Appearance: She is not ill-appearing.   HENT:      Head: Normocephalic.      Nose: No congestion.      Mouth/Throat:      Mouth: Mucous membranes are dry.      Pharynx: Oropharynx is clear.   Eyes:      General: No scleral icterus.     Conjunctiva/sclera: Conjunctivae normal.   Cardiovascular:      Rate and Rhythm: Normal rate and regular rhythm.      Heart sounds:      No gallop.   Pulmonary:      Effort: Pulmonary effort is normal.      Breath sounds: No wheezing or rhonchi.   Abdominal:      General: Bowel  sounds are normal.      Tenderness: There is no abdominal tenderness. There is no rebound.   Musculoskeletal:      Cervical back: Neck supple.      Right lower leg: Edema present.      Left lower leg: Edema present.   Lymphadenopathy:      Cervical: No cervical adenopathy.   Skin:     General: Skin is warm and dry.   Neurological:      General: No focal deficit present.         Assessment/Plan   Problem List Items Addressed This Visit             ICD-10-CM    GERD (gastroesophageal reflux disease) K21.9    Mixed hyperlipidemia E78.2    Hypokalemia E87.6    Primary hypertension I10     Other Visit Diagnoses         Codes    Chronic pain of left knee    -  Primary M25.562, G89.29    Chronic left shoulder pain     M25.512, G89.29            CBC CMP TSH T4 T3 and lipid panel due in February.  Potassium has remained stable and no complaints of myalgias. Consider low dose gabapentin for joint pain.    Goals    None

## 2025-02-21 ENCOUNTER — NURSING HOME VISIT (OUTPATIENT)
Dept: POST ACUTE CARE | Facility: EXTERNAL LOCATION | Age: 60
End: 2025-02-21
Payer: MEDICAID

## 2025-02-21 DIAGNOSIS — K21.9 GASTROESOPHAGEAL REFLUX DISEASE WITHOUT ESOPHAGITIS: ICD-10-CM

## 2025-02-21 DIAGNOSIS — E87.6 HYPOKALEMIA: ICD-10-CM

## 2025-02-21 DIAGNOSIS — E03.9 ACQUIRED HYPOTHYROIDISM: Primary | ICD-10-CM

## 2025-02-21 DIAGNOSIS — I10 PRIMARY HYPERTENSION: ICD-10-CM

## 2025-02-21 DIAGNOSIS — E78.2 MIXED HYPERLIPIDEMIA: ICD-10-CM

## 2025-02-21 PROCEDURE — 99309 SBSQ NF CARE MODERATE MDM 30: CPT | Performed by: FAMILY MEDICINE

## 2025-02-21 NOTE — LETTER
Patient: Olga Joyce  : 1965    Encounter Date: 2025    Subjective  Patient ID: Olga Joyce is a 59 y.o. female who is seen for long term care being seen and evaluated for multiple medical problems.    HPI she is doing fairly well this week. She has ongoing shoulder pain to right shoulder despite neg xray and seeing PT. she had labs this week that showed TSH normal with slightly low T3 noted but T4 in range, GFR 56, creatinine 1.0 with normal GFR, total cholesterol 184 with LDL of 114 normal protein levels hemoglobin normal 12.9 and normal liver enzymes noted.       Review of Systems   Constitutional:  Positive for fatigue. Negative for chills and fever.   HENT:  Negative for congestion, hearing loss and sore throat.    Eyes:  Negative for visual disturbance.   Respiratory:  Negative for cough, shortness of breath and wheezing.    Cardiovascular:  Negative for chest pain, palpitations and leg swelling.   Gastrointestinal:  Negative for abdominal pain, constipation, diarrhea, nausea and vomiting.   Genitourinary:  Negative for dysuria, frequency, hematuria and urgency.   Musculoskeletal:  Positive for arthralgias and gait problem.   Skin:  Negative for rash.   Neurological:  Negative for dizziness, syncope and headaches.   Psychiatric/Behavioral:  Negative for dysphoric mood. The patient is not nervous/anxious.        Objective  There were no vitals taken for this visit.    Physical Exam  Vitals reviewed: 132/68 temp 97.6 pulse 76 weight 353 which is down 7 pounds.   Constitutional:       General: She is not in acute distress.     Appearance: She is not ill-appearing.   HENT:      Head: Normocephalic.      Nose: No congestion.      Mouth/Throat:      Mouth: Mucous membranes are dry.      Pharynx: Oropharynx is clear.   Eyes:      General: No scleral icterus.     Conjunctiva/sclera: Conjunctivae normal.   Cardiovascular:      Rate and Rhythm: Normal rate and regular rhythm.      Heart sounds:      No  gallop.   Pulmonary:      Effort: Pulmonary effort is normal.      Breath sounds: No wheezing or rhonchi.   Abdominal:      General: Bowel sounds are normal.      Tenderness: There is no abdominal tenderness. There is no rebound.   Musculoskeletal:      Cervical back: Neck supple.      Right lower leg: Edema present.      Left lower leg: Edema present.   Lymphadenopathy:      Cervical: No cervical adenopathy.   Skin:     General: Skin is warm and dry.   Neurological:      General: No focal deficit present.         Assessment/Plan  Problem List Items Addressed This Visit             ICD-10-CM    GERD (gastroesophageal reflux disease) K21.9    Mixed hyperlipidemia E78.2    Hypokalemia E87.6    Primary hypertension I10     Other Visit Diagnoses         Codes    Acquired hypothyroidism    -  Primary E03.9            Labs overall look stable, weight is trending downward therefore for now we will not treat low T3 with [place] Thyroid, continue to monitor weights as well as edema and recheck labs will be due in August   Goals    None           Electronically Signed By: Kady Conti MD   2/23/25  6:56 PM

## 2025-02-23 ASSESSMENT — ENCOUNTER SYMPTOMS
VOMITING: 0
ARTHRALGIAS: 1
FEVER: 0
SORE THROAT: 0
ABDOMINAL PAIN: 0
DIARRHEA: 0
WHEEZING: 0
HEADACHES: 0
NERVOUS/ANXIOUS: 0
COUGH: 0
FREQUENCY: 0
CHILLS: 0
PALPITATIONS: 0
DYSURIA: 0
SHORTNESS OF BREATH: 0
HEMATURIA: 0
NAUSEA: 0
CONSTIPATION: 0
FATIGUE: 1
DYSPHORIC MOOD: 0
DIZZINESS: 0

## 2025-02-23 NOTE — PROGRESS NOTES
Subjective   Patient ID: Olga Joyce is a 59 y.o. female who is seen for long term care being seen and evaluated for multiple medical problems.    HPI she is doing fairly well this week. She has ongoing shoulder pain to right shoulder despite neg xray and seeing PT. she had labs this week that showed TSH normal with slightly low T3 noted but T4 in range, GFR 56, creatinine 1.0 with normal GFR, total cholesterol 184 with LDL of 114 normal protein levels hemoglobin normal 12.9 and normal liver enzymes noted.       Review of Systems   Constitutional:  Positive for fatigue. Negative for chills and fever.   HENT:  Negative for congestion, hearing loss and sore throat.    Eyes:  Negative for visual disturbance.   Respiratory:  Negative for cough, shortness of breath and wheezing.    Cardiovascular:  Negative for chest pain, palpitations and leg swelling.   Gastrointestinal:  Negative for abdominal pain, constipation, diarrhea, nausea and vomiting.   Genitourinary:  Negative for dysuria, frequency, hematuria and urgency.   Musculoskeletal:  Positive for arthralgias and gait problem.   Skin:  Negative for rash.   Neurological:  Negative for dizziness, syncope and headaches.   Psychiatric/Behavioral:  Negative for dysphoric mood. The patient is not nervous/anxious.        Objective   There were no vitals taken for this visit.    Physical Exam  Vitals reviewed: 132/68 temp 97.6 pulse 76 weight 353 which is down 7 pounds.   Constitutional:       General: She is not in acute distress.     Appearance: She is not ill-appearing.   HENT:      Head: Normocephalic.      Nose: No congestion.      Mouth/Throat:      Mouth: Mucous membranes are dry.      Pharynx: Oropharynx is clear.   Eyes:      General: No scleral icterus.     Conjunctiva/sclera: Conjunctivae normal.   Cardiovascular:      Rate and Rhythm: Normal rate and regular rhythm.      Heart sounds:      No gallop.   Pulmonary:      Effort: Pulmonary effort is normal.       Breath sounds: No wheezing or rhonchi.   Abdominal:      General: Bowel sounds are normal.      Tenderness: There is no abdominal tenderness. There is no rebound.   Musculoskeletal:      Cervical back: Neck supple.      Right lower leg: Edema present.      Left lower leg: Edema present.   Lymphadenopathy:      Cervical: No cervical adenopathy.   Skin:     General: Skin is warm and dry.   Neurological:      General: No focal deficit present.         Assessment/Plan   Problem List Items Addressed This Visit             ICD-10-CM    GERD (gastroesophageal reflux disease) K21.9    Mixed hyperlipidemia E78.2    Hypokalemia E87.6    Primary hypertension I10     Other Visit Diagnoses         Codes    Acquired hypothyroidism    -  Primary E03.9            Labs overall look stable, weight is trending downward therefore for now we will not treat low T3 with [place] Thyroid, continue to monitor weights as well as edema and recheck labs will be due in August   Goals    None

## 2025-03-21 ENCOUNTER — NURSING HOME VISIT (OUTPATIENT)
Dept: POST ACUTE CARE | Facility: EXTERNAL LOCATION | Age: 60
End: 2025-03-21
Payer: MEDICAID

## 2025-03-21 DIAGNOSIS — I48.91 ATRIAL FIBRILLATION WITH RVR (MULTI): ICD-10-CM

## 2025-03-21 DIAGNOSIS — E78.2 MIXED HYPERLIPIDEMIA: ICD-10-CM

## 2025-03-21 DIAGNOSIS — I89.0 LYMPHEDEMA: Primary | ICD-10-CM

## 2025-03-21 PROCEDURE — 99308 SBSQ NF CARE LOW MDM 20: CPT | Performed by: FAMILY MEDICINE

## 2025-03-21 NOTE — LETTER
Patient: Olga Joyce  : 1965    Encounter Date: 2025    Subjective  Patient ID: Olga Joyce is a 60 y.o. female who is seen for long term care being seen and evaluated for multiple medical problems.    HPI she is doing fairly well this week.  No complaints of shoulder pain today.  Labs done in February reviewed showed normal T4 low T3 GFR 56 total cholesterol controlled with hemoglobin 12.9 and normal LFTs.       Review of Systems   Constitutional:  Positive for fatigue. Negative for chills and fever.   HENT:  Negative for congestion, hearing loss and sore throat.    Eyes:  Negative for visual disturbance.   Respiratory:  Negative for cough, shortness of breath and wheezing.    Cardiovascular:  Negative for chest pain, palpitations and leg swelling.   Gastrointestinal:  Negative for abdominal pain, constipation, diarrhea, nausea and vomiting.   Genitourinary:  Negative for dysuria, frequency, hematuria and urgency.   Musculoskeletal:  Positive for arthralgias and gait problem.   Skin:  Negative for rash.   Neurological:  Negative for dizziness, syncope and headaches.   Psychiatric/Behavioral:  Negative for dysphoric mood. The patient is not nervous/anxious.        Objective  There were no vitals taken for this visit.    Physical Exam  Vitals reviewed: 132/68 temp 97.6 pulse 76 weight 359-up another 6 pounds.   Constitutional:       General: She is not in acute distress.     Appearance: She is not ill-appearing.   HENT:      Head: Normocephalic.      Nose: No congestion.      Mouth/Throat:      Mouth: Mucous membranes are dry.      Pharynx: Oropharynx is clear.   Eyes:      General: No scleral icterus.     Conjunctiva/sclera: Conjunctivae normal.   Cardiovascular:      Rate and Rhythm: Normal rate and regular rhythm.      Heart sounds:      No gallop.   Pulmonary:      Effort: Pulmonary effort is normal.      Breath sounds: No wheezing or rhonchi.   Abdominal:      General: Bowel sounds are normal.       Tenderness: There is no abdominal tenderness. There is no rebound.   Musculoskeletal:      Cervical back: Neck supple.      Right lower leg: Edema present.      Left lower leg: Edema present.   Lymphadenopathy:      Cervical: No cervical adenopathy.   Skin:     General: Skin is warm and dry.   Neurological:      General: No focal deficit present.         Assessment/Plan  Problem List Items Addressed This Visit    None    Weights back up this month, consider adding [place] Thyroid if trend continues, for now plan for labs still in August but consider rechecks in April or May if weight gain continues   Goals    None           Electronically Signed By: Kady Conti MD   3/24/25  1:48 PM

## 2025-03-24 ASSESSMENT — ENCOUNTER SYMPTOMS
WHEEZING: 0
FEVER: 0
VOMITING: 0
PALPITATIONS: 0
DYSPHORIC MOOD: 0
SORE THROAT: 0
COUGH: 0
CHILLS: 0
CONSTIPATION: 0
DIARRHEA: 0
NAUSEA: 0
HEADACHES: 0
NERVOUS/ANXIOUS: 0
SHORTNESS OF BREATH: 0
FREQUENCY: 0
HEMATURIA: 0
FATIGUE: 1
DIZZINESS: 0
ABDOMINAL PAIN: 0
ARTHRALGIAS: 1
DYSURIA: 0

## 2025-03-24 NOTE — PROGRESS NOTES
Subjective   Patient ID: Olga Joyce is a 60 y.o. female who is seen for long term care being seen and evaluated for multiple medical problems.    HPI she is doing fairly well this week.  No complaints of shoulder pain today.  Labs done in February reviewed showed normal T4 low T3 GFR 56 total cholesterol controlled with hemoglobin 12.9 and normal LFTs.       Review of Systems   Constitutional:  Positive for fatigue. Negative for chills and fever.   HENT:  Negative for congestion, hearing loss and sore throat.    Eyes:  Negative for visual disturbance.   Respiratory:  Negative for cough, shortness of breath and wheezing.    Cardiovascular:  Negative for chest pain, palpitations and leg swelling.   Gastrointestinal:  Negative for abdominal pain, constipation, diarrhea, nausea and vomiting.   Genitourinary:  Negative for dysuria, frequency, hematuria and urgency.   Musculoskeletal:  Positive for arthralgias and gait problem.   Skin:  Negative for rash.   Neurological:  Negative for dizziness, syncope and headaches.   Psychiatric/Behavioral:  Negative for dysphoric mood. The patient is not nervous/anxious.        Objective   There were no vitals taken for this visit.    Physical Exam  Vitals reviewed: 132/68 temp 97.6 pulse 76 weight 359-up another 6 pounds.   Constitutional:       General: She is not in acute distress.     Appearance: She is not ill-appearing.   HENT:      Head: Normocephalic.      Nose: No congestion.      Mouth/Throat:      Mouth: Mucous membranes are dry.      Pharynx: Oropharynx is clear.   Eyes:      General: No scleral icterus.     Conjunctiva/sclera: Conjunctivae normal.   Cardiovascular:      Rate and Rhythm: Normal rate and regular rhythm.      Heart sounds:      No gallop.   Pulmonary:      Effort: Pulmonary effort is normal.      Breath sounds: No wheezing or rhonchi.   Abdominal:      General: Bowel sounds are normal.      Tenderness: There is no abdominal tenderness. There is no  rebound.   Musculoskeletal:      Cervical back: Neck supple.      Right lower leg: Edema present.      Left lower leg: Edema present.   Lymphadenopathy:      Cervical: No cervical adenopathy.   Skin:     General: Skin is warm and dry.   Neurological:      General: No focal deficit present.         Assessment/Plan   Problem List Items Addressed This Visit    None    Weights back up this month, consider adding [place] Thyroid if trend continues, for now plan for labs still in August but consider rechecks in April or May if weight gain continues   Goals    None

## 2025-04-11 ENCOUNTER — NURSING HOME VISIT (OUTPATIENT)
Dept: POST ACUTE CARE | Facility: EXTERNAL LOCATION | Age: 60
End: 2025-04-11
Payer: MEDICAID

## 2025-04-11 DIAGNOSIS — I89.0 LYMPHEDEMA: ICD-10-CM

## 2025-04-11 DIAGNOSIS — E03.9 ACQUIRED HYPOTHYROIDISM: ICD-10-CM

## 2025-04-11 DIAGNOSIS — I10 PRIMARY HYPERTENSION: Primary | ICD-10-CM

## 2025-04-11 PROCEDURE — 99309 SBSQ NF CARE MODERATE MDM 30: CPT | Performed by: FAMILY MEDICINE

## 2025-04-11 NOTE — LETTER
Patient: Olga Joyce  : 1965    Encounter Date: 2025    Subjective  Patient ID: Olga Joyce is a 60 y.o. female who is seen for long term care being seen and evaluated for multiple medical problems.    HPI she is doing fairly well this month. Monitoring weights to see if recheck thyroid needed before August. day. February labs showed slightly low t3 and may benefit from armour thyroid.        Review of Systems   Constitutional:  Positive for fatigue. Negative for chills and fever.   HENT:  Negative for congestion, hearing loss and sore throat.    Eyes:  Negative for visual disturbance.   Respiratory:  Negative for cough, shortness of breath and wheezing.    Cardiovascular:  Negative for chest pain, palpitations and leg swelling.   Gastrointestinal:  Negative for abdominal pain, constipation, diarrhea, nausea and vomiting.   Genitourinary:  Negative for dysuria, frequency, hematuria and urgency.   Musculoskeletal:  Positive for arthralgias and gait problem.   Skin:  Negative for rash.   Neurological:  Negative for dizziness, syncope and headaches.   Psychiatric/Behavioral:  Negative for dysphoric mood. The patient is not nervous/anxious.        Objective  There were no vitals taken for this visit.    Physical Exam  Vitals reviewed: 165/81 60 97.9 no weight for april?.   Constitutional:       General: She is not in acute distress.     Appearance: She is not ill-appearing.   HENT:      Head: Normocephalic.      Nose: No congestion.      Mouth/Throat:      Mouth: Mucous membranes are dry.      Pharynx: Oropharynx is clear.   Eyes:      General: No scleral icterus.     Conjunctiva/sclera: Conjunctivae normal.   Cardiovascular:      Rate and Rhythm: Normal rate and regular rhythm.      Heart sounds:      No gallop.   Pulmonary:      Effort: Pulmonary effort is normal.      Breath sounds: No wheezing or rhonchi.   Abdominal:      General: Bowel sounds are normal.      Tenderness: There is no abdominal  tenderness. There is no rebound.   Musculoskeletal:      Cervical back: Neck supple.      Right lower leg: Edema present.      Left lower leg: Edema present.   Lymphadenopathy:      Cervical: No cervical adenopathy.   Skin:     General: Skin is warm and dry.   Neurological:      General: No focal deficit present.         Assessment/Plan  Problem List Items Addressed This Visit             ICD-10-CM    Lymphedema I89.0    Primary hypertension - Primary I10     Other Visit Diagnoses         Codes    Acquired hypothyroidism     E03.9        No weight for April  With higher bp increase losartan and recheck labs-suggests ongoing weight gain accurate and now causing increase bp as well      Electronically Signed By: Kady Conti MD   4/13/25 11:26 PM

## 2025-04-13 ASSESSMENT — ENCOUNTER SYMPTOMS
DYSPHORIC MOOD: 0
CONSTIPATION: 0
HEMATURIA: 0
WHEEZING: 0
ABDOMINAL PAIN: 0
SORE THROAT: 0
NAUSEA: 0
SHORTNESS OF BREATH: 0
PALPITATIONS: 0
VOMITING: 0
COUGH: 0
FREQUENCY: 0
NERVOUS/ANXIOUS: 0
ARTHRALGIAS: 1
HEADACHES: 0
DIZZINESS: 0
DIARRHEA: 0
FEVER: 0
FATIGUE: 1
CHILLS: 0
DYSURIA: 0

## 2025-04-14 NOTE — PROGRESS NOTES
Subjective   Patient ID: Olga Joyce is a 60 y.o. female who is seen for long term care being seen and evaluated for multiple medical problems.    HPI she is doing fairly well this month. Monitoring weights to see if recheck thyroid needed before August. day. February labs showed slightly low t3 and may benefit from armour thyroid.        Review of Systems   Constitutional:  Positive for fatigue. Negative for chills and fever.   HENT:  Negative for congestion, hearing loss and sore throat.    Eyes:  Negative for visual disturbance.   Respiratory:  Negative for cough, shortness of breath and wheezing.    Cardiovascular:  Negative for chest pain, palpitations and leg swelling.   Gastrointestinal:  Negative for abdominal pain, constipation, diarrhea, nausea and vomiting.   Genitourinary:  Negative for dysuria, frequency, hematuria and urgency.   Musculoskeletal:  Positive for arthralgias and gait problem.   Skin:  Negative for rash.   Neurological:  Negative for dizziness, syncope and headaches.   Psychiatric/Behavioral:  Negative for dysphoric mood. The patient is not nervous/anxious.        Objective   There were no vitals taken for this visit.    Physical Exam  Vitals reviewed: 165/81 60 97.9 no weight for april?.   Constitutional:       General: She is not in acute distress.     Appearance: She is not ill-appearing.   HENT:      Head: Normocephalic.      Nose: No congestion.      Mouth/Throat:      Mouth: Mucous membranes are dry.      Pharynx: Oropharynx is clear.   Eyes:      General: No scleral icterus.     Conjunctiva/sclera: Conjunctivae normal.   Cardiovascular:      Rate and Rhythm: Normal rate and regular rhythm.      Heart sounds:      No gallop.   Pulmonary:      Effort: Pulmonary effort is normal.      Breath sounds: No wheezing or rhonchi.   Abdominal:      General: Bowel sounds are normal.      Tenderness: There is no abdominal tenderness. There is no rebound.   Musculoskeletal:      Cervical back:  Neck supple.      Right lower leg: Edema present.      Left lower leg: Edema present.   Lymphadenopathy:      Cervical: No cervical adenopathy.   Skin:     General: Skin is warm and dry.   Neurological:      General: No focal deficit present.         Assessment/Plan   Problem List Items Addressed This Visit             ICD-10-CM    Lymphedema I89.0    Primary hypertension - Primary I10     Other Visit Diagnoses         Codes    Acquired hypothyroidism     E03.9        No weight for April  With higher bp increase losartan and recheck labs-suggests ongoing weight gain accurate and now causing increase bp as well

## 2025-05-16 ENCOUNTER — NURSING HOME VISIT (OUTPATIENT)
Dept: POST ACUTE CARE | Facility: EXTERNAL LOCATION | Age: 60
End: 2025-05-16
Payer: MEDICAID

## 2025-05-16 DIAGNOSIS — I48.91 ATRIAL FIBRILLATION WITH RVR (MULTI): ICD-10-CM

## 2025-05-16 DIAGNOSIS — E78.2 MIXED HYPERLIPIDEMIA: ICD-10-CM

## 2025-05-16 DIAGNOSIS — I89.0 LYMPHEDEMA: ICD-10-CM

## 2025-05-16 DIAGNOSIS — E03.9 ACQUIRED HYPOTHYROIDISM: Primary | ICD-10-CM

## 2025-05-16 PROCEDURE — 99309 SBSQ NF CARE MODERATE MDM 30: CPT | Performed by: FAMILY MEDICINE

## 2025-05-16 NOTE — LETTER
Patient: Olga Joyce  : 1965    Encounter Date: 2025    Subjective  Patient ID: Olga Joyce is a 60 y.o. female who is seen for long term care being seen and evaluated for multiple medical problems.    HPI she is doing fairly well this month. Labs in may showed nl lfts, ldl 85, nl gfr, thyroid function in range, no anemia. Had low total t3. And weight gain again noted. Will add low dose t3.         Review of Systems   Constitutional:  Positive for fatigue. Negative for chills and fever.   HENT:  Negative for congestion, hearing loss and sore throat.    Eyes:  Negative for visual disturbance.   Respiratory:  Negative for cough, shortness of breath and wheezing.    Cardiovascular:  Negative for chest pain, palpitations and leg swelling.   Gastrointestinal:  Negative for abdominal pain, constipation, diarrhea, nausea and vomiting.   Genitourinary:  Negative for dysuria, frequency, hematuria and urgency.   Musculoskeletal:  Positive for arthralgias and gait problem.   Skin:  Negative for rash.   Neurological:  Negative for dizziness, syncope and headaches.   Psychiatric/Behavioral:  Negative for dysphoric mood. The patient is not nervous/anxious.        Objective  There were no vitals taken for this visit.    Physical Exam  Vitals reviewed: 144/81 97.9 76 wt 372.   Constitutional:       General: She is not in acute distress.     Appearance: She is not ill-appearing.   HENT:      Head: Normocephalic.      Nose: No congestion.      Mouth/Throat:      Mouth: Mucous membranes are dry.      Pharynx: Oropharynx is clear.   Eyes:      General: No scleral icterus.     Conjunctiva/sclera: Conjunctivae normal.   Cardiovascular:      Rate and Rhythm: Normal rate and regular rhythm.      Heart sounds:      No gallop.   Pulmonary:      Effort: Pulmonary effort is normal.      Breath sounds: No wheezing or rhonchi.   Abdominal:      General: Bowel sounds are normal.      Tenderness: There is no abdominal tenderness.  There is no rebound.   Musculoskeletal:      Cervical back: Neck supple.      Right lower leg: Edema present.      Left lower leg: Edema present.   Lymphadenopathy:      Cervical: No cervical adenopathy.   Skin:     General: Skin is warm and dry.   Neurological:      General: No focal deficit present.         Assessment/Plan  Problem List Items Addressed This Visit           ICD-10-CM    Lymphedema I89.0    Atrial fibrillation with RVR (Multi) I48.91    Mixed hyperlipidemia E78.2     Other Visit Diagnoses         Codes      Acquired hypothyroidism    -  Primary E03.9        Unsure if weight gain is edema versus true weight gain, add low dose cytomel and monitor weights, edema and tsh/t4/t3.     Electronically Signed By: Kady Conti MD   5/18/25 11:00 PM

## 2025-05-18 PROBLEM — I50.33 ACUTE ON CHRONIC DIASTOLIC CONGESTIVE HEART FAILURE: Status: RESOLVED | Noted: 2023-10-29 | Resolved: 2025-05-18

## 2025-05-18 ASSESSMENT — ENCOUNTER SYMPTOMS
CHILLS: 0
NAUSEA: 0
WHEEZING: 0
DYSPHORIC MOOD: 0
CONSTIPATION: 0
ABDOMINAL PAIN: 0
SHORTNESS OF BREATH: 0
ARTHRALGIAS: 1
PALPITATIONS: 0
COUGH: 0
FATIGUE: 1
DIZZINESS: 0
SORE THROAT: 0
NERVOUS/ANXIOUS: 0
FEVER: 0
FREQUENCY: 0
VOMITING: 0
HEMATURIA: 0
DIARRHEA: 0
DYSURIA: 0
HEADACHES: 0

## 2025-05-19 NOTE — PROGRESS NOTES
Subjective   Patient ID: Olga Joyce is a 60 y.o. female who is seen for long term care being seen and evaluated for multiple medical problems.    HPI she is doing fairly well this month. Labs in may showed nl lfts, ldl 85, nl gfr, thyroid function in range, no anemia. Had low total t3. And weight gain again noted. Will add low dose t3.         Review of Systems   Constitutional:  Positive for fatigue. Negative for chills and fever.   HENT:  Negative for congestion, hearing loss and sore throat.    Eyes:  Negative for visual disturbance.   Respiratory:  Negative for cough, shortness of breath and wheezing.    Cardiovascular:  Negative for chest pain, palpitations and leg swelling.   Gastrointestinal:  Negative for abdominal pain, constipation, diarrhea, nausea and vomiting.   Genitourinary:  Negative for dysuria, frequency, hematuria and urgency.   Musculoskeletal:  Positive for arthralgias and gait problem.   Skin:  Negative for rash.   Neurological:  Negative for dizziness, syncope and headaches.   Psychiatric/Behavioral:  Negative for dysphoric mood. The patient is not nervous/anxious.        Objective   There were no vitals taken for this visit.    Physical Exam  Vitals reviewed: 144/81 97.9 76 wt 372.   Constitutional:       General: She is not in acute distress.     Appearance: She is not ill-appearing.   HENT:      Head: Normocephalic.      Nose: No congestion.      Mouth/Throat:      Mouth: Mucous membranes are dry.      Pharynx: Oropharynx is clear.   Eyes:      General: No scleral icterus.     Conjunctiva/sclera: Conjunctivae normal.   Cardiovascular:      Rate and Rhythm: Normal rate and regular rhythm.      Heart sounds:      No gallop.   Pulmonary:      Effort: Pulmonary effort is normal.      Breath sounds: No wheezing or rhonchi.   Abdominal:      General: Bowel sounds are normal.      Tenderness: There is no abdominal tenderness. There is no rebound.   Musculoskeletal:      Cervical back: Neck  supple.      Right lower leg: Edema present.      Left lower leg: Edema present.   Lymphadenopathy:      Cervical: No cervical adenopathy.   Skin:     General: Skin is warm and dry.   Neurological:      General: No focal deficit present.         Assessment/Plan   Problem List Items Addressed This Visit           ICD-10-CM    Lymphedema I89.0    Atrial fibrillation with RVR (Multi) I48.91    Mixed hyperlipidemia E78.2     Other Visit Diagnoses         Codes      Acquired hypothyroidism    -  Primary E03.9        Unsure if weight gain is edema versus true weight gain, add low dose cytomel and monitor weights, edema and tsh/t4/t3.    no

## 2025-06-20 ENCOUNTER — NURSING HOME VISIT (OUTPATIENT)
Dept: POST ACUTE CARE | Facility: EXTERNAL LOCATION | Age: 60
End: 2025-06-20
Payer: MEDICAID

## 2025-06-20 DIAGNOSIS — K21.9 GASTROESOPHAGEAL REFLUX DISEASE WITHOUT ESOPHAGITIS: ICD-10-CM

## 2025-06-20 DIAGNOSIS — E03.9 ACQUIRED HYPOTHYROIDISM: ICD-10-CM

## 2025-06-20 DIAGNOSIS — I89.0 LYMPHEDEMA: ICD-10-CM

## 2025-06-20 DIAGNOSIS — E78.5 DYSLIPIDEMIA: ICD-10-CM

## 2025-06-20 DIAGNOSIS — I10 PRIMARY HYPERTENSION: Primary | ICD-10-CM

## 2025-06-20 PROCEDURE — 99309 SBSQ NF CARE MODERATE MDM 30: CPT | Performed by: FAMILY MEDICINE

## 2025-06-20 NOTE — LETTER
Patient: Olga Joyce  : 1965    Encounter Date: 2025    Subjective  Patient ID: Olga Joyce is a 60 y.o. female who is seen for long term care being seen and evaluated for multiple medical problems.    HPI she is doing fairly well this month. Labs in may showed nl lfts, ldl 85, nl gfr, thyroid function in range, no anemia. Had low total t3. And weight gain again noted. Meds increased. Recheck tsh and t4 needed -ordered for next week.      Review of Systems   Constitutional:  Positive for fatigue. Negative for chills and fever.   HENT:  Negative for congestion, hearing loss and sore throat.    Eyes:  Negative for visual disturbance.   Respiratory:  Negative for cough, shortness of breath and wheezing.    Cardiovascular:  Negative for chest pain, palpitations and leg swelling.   Gastrointestinal:  Negative for abdominal pain, constipation, diarrhea, nausea and vomiting.   Genitourinary:  Negative for dysuria, frequency, hematuria and urgency.   Musculoskeletal:  Positive for arthralgias and gait problem.   Skin:  Negative for rash.   Neurological:  Negative for dizziness, syncope and headaches.   Psychiatric/Behavioral:  Negative for dysphoric mood. The patient is not nervous/anxious.        Objective  There were no vitals taken for this visit.    Physical Exam  Vitals reviewed: 132/68 68 97.1 wt 375-up 3.   Constitutional:       General: She is not in acute distress.     Appearance: She is not ill-appearing.   HENT:      Head: Normocephalic.      Nose: No congestion.      Mouth/Throat:      Mouth: Mucous membranes are dry.      Pharynx: Oropharynx is clear.   Eyes:      General: No scleral icterus.     Conjunctiva/sclera: Conjunctivae normal.   Cardiovascular:      Rate and Rhythm: Normal rate and regular rhythm.      Heart sounds:      No gallop.   Pulmonary:      Effort: Pulmonary effort is normal.      Breath sounds: No wheezing or rhonchi.   Abdominal:      General: Bowel sounds are normal.       Tenderness: There is no abdominal tenderness. There is no rebound.   Musculoskeletal:      Cervical back: Neck supple.      Right lower leg: Edema present.      Left lower leg: Edema present.   Lymphadenopathy:      Cervical: No cervical adenopathy.   Skin:     General: Skin is warm and dry.   Neurological:      General: No focal deficit present.         Assessment/Plan  Problem List Items Addressed This Visit           ICD-10-CM    Lymphedema I89.0    Dyslipidemia E78.5    GERD (gastroesophageal reflux disease) K21.9    Primary hypertension - Primary I10     Recheck thyroid ordered   Some weight fluctuations may be edema related      Electronically Signed By: Kady Conti MD   6/22/25 10:01 PM

## 2025-06-22 ASSESSMENT — ENCOUNTER SYMPTOMS
VOMITING: 0
FATIGUE: 1
WHEEZING: 0
DIARRHEA: 0
SHORTNESS OF BREATH: 0
DYSPHORIC MOOD: 0
SORE THROAT: 0
CONSTIPATION: 0
COUGH: 0
FREQUENCY: 0
ARTHRALGIAS: 1
PALPITATIONS: 0
FEVER: 0
NERVOUS/ANXIOUS: 0
DYSURIA: 0
NAUSEA: 0
ABDOMINAL PAIN: 0
HEMATURIA: 0
HEADACHES: 0
CHILLS: 0
DIZZINESS: 0

## 2025-06-23 NOTE — PROGRESS NOTES
Subjective   Patient ID: Olga Joyce is a 60 y.o. female who is seen for long term care being seen and evaluated for multiple medical problems.    HPI she is doing fairly well this month. Labs in may showed nl lfts, ldl 85, nl gfr, thyroid function in range, no anemia. Had low total t3. And weight gain again noted. Meds increased. Recheck tsh and t4 needed -ordered for next week.      Review of Systems   Constitutional:  Positive for fatigue. Negative for chills and fever.   HENT:  Negative for congestion, hearing loss and sore throat.    Eyes:  Negative for visual disturbance.   Respiratory:  Negative for cough, shortness of breath and wheezing.    Cardiovascular:  Negative for chest pain, palpitations and leg swelling.   Gastrointestinal:  Negative for abdominal pain, constipation, diarrhea, nausea and vomiting.   Genitourinary:  Negative for dysuria, frequency, hematuria and urgency.   Musculoskeletal:  Positive for arthralgias and gait problem.   Skin:  Negative for rash.   Neurological:  Negative for dizziness, syncope and headaches.   Psychiatric/Behavioral:  Negative for dysphoric mood. The patient is not nervous/anxious.        Objective   There were no vitals taken for this visit.    Physical Exam  Vitals reviewed: 132/68 68 97.1 wt 375-up 3.   Constitutional:       General: She is not in acute distress.     Appearance: She is not ill-appearing.   HENT:      Head: Normocephalic.      Nose: No congestion.      Mouth/Throat:      Mouth: Mucous membranes are dry.      Pharynx: Oropharynx is clear.   Eyes:      General: No scleral icterus.     Conjunctiva/sclera: Conjunctivae normal.   Cardiovascular:      Rate and Rhythm: Normal rate and regular rhythm.      Heart sounds:      No gallop.   Pulmonary:      Effort: Pulmonary effort is normal.      Breath sounds: No wheezing or rhonchi.   Abdominal:      General: Bowel sounds are normal.      Tenderness: There is no abdominal tenderness. There is no rebound.    Musculoskeletal:      Cervical back: Neck supple.      Right lower leg: Edema present.      Left lower leg: Edema present.   Lymphadenopathy:      Cervical: No cervical adenopathy.   Skin:     General: Skin is warm and dry.   Neurological:      General: No focal deficit present.         Assessment/Plan   Problem List Items Addressed This Visit           ICD-10-CM    Lymphedema I89.0    Dyslipidemia E78.5    GERD (gastroesophageal reflux disease) K21.9    Primary hypertension - Primary I10     Recheck thyroid ordered   Some weight fluctuations may be edema related

## 2025-07-25 ENCOUNTER — NURSING HOME VISIT (OUTPATIENT)
Dept: POST ACUTE CARE | Facility: EXTERNAL LOCATION | Age: 60
End: 2025-07-25
Payer: MEDICAID

## 2025-07-25 DIAGNOSIS — E03.9 ACQUIRED HYPOTHYROIDISM: ICD-10-CM

## 2025-07-25 DIAGNOSIS — I10 PRIMARY HYPERTENSION: ICD-10-CM

## 2025-07-25 DIAGNOSIS — L03.116 CELLULITIS OF LEFT HIP: Primary | ICD-10-CM

## 2025-07-25 NOTE — LETTER
Patient: Olga Joyce  : 1965    Encounter Date: 2025    Subjective  Patient ID: Olga Joyce is a 60 y.o. female who is seen for long term care being seen and evaluated for multiple medical problems.    HPI she is doing fairly well this month. Labs in may showed nl lfts, ldl 85, nl gfr, thyroid function in range, no anemia. Had low total t3. And weight gain again noted. Meds increased. Recheck tsh and t4 ordered for beginning of August. She also had left hip redness and now on abx- doxy started. No fevers. Redness slightly  better per staff.       Review of Systems   Constitutional:  Positive for fatigue. Negative for chills and fever.   HENT:  Negative for congestion, hearing loss and sore throat.    Eyes:  Negative for visual disturbance.   Respiratory:  Negative for cough, shortness of breath and wheezing.    Cardiovascular:  Negative for chest pain, palpitations and leg swelling.   Gastrointestinal:  Negative for abdominal pain, constipation, diarrhea, nausea and vomiting.   Genitourinary:  Negative for dysuria, frequency, hematuria and urgency.   Musculoskeletal:  Positive for arthralgias and gait problem.   Skin:  Positive for color change and rash.   Neurological:  Negative for dizziness, syncope and headaches.   Psychiatric/Behavioral:  Negative for dysphoric mood. The patient is not nervous/anxious.        Objective  There were no vitals taken for this visit.    Physical Exam  Vitals reviewed: 132/68 68 97.1 wt 381-up 6 more.   Constitutional:       General: She is not in acute distress.     Appearance: She is not ill-appearing.   HENT:      Head: Normocephalic.      Nose: No congestion.      Mouth/Throat:      Mouth: Mucous membranes are dry.      Pharynx: Oropharynx is clear.     Eyes:      General: No scleral icterus.     Conjunctiva/sclera: Conjunctivae normal.       Cardiovascular:      Rate and Rhythm: Normal rate and regular rhythm.      Heart sounds:      No gallop.   Pulmonary:       Effort: Pulmonary effort is normal.      Breath sounds: No wheezing or rhonchi.   Abdominal:      General: Bowel sounds are normal.      Tenderness: There is no abdominal tenderness. There is no rebound.     Musculoskeletal:      Cervical back: Neck supple.      Right lower leg: Edema present.      Left lower leg: Edema present.   Lymphadenopathy:      Cervical: No cervical adenopathy.     Skin:     General: Skin is warm and dry.      Findings: Erythema and rash present.     Neurological:      General: No focal deficit present.         Assessment/Plan  Problem List Items Addressed This Visit           ICD-10-CM    Primary hypertension I10     Other Visit Diagnoses         Codes      Cellulitis of left hip    -  Primary L03.116      Acquired hypothyroidism     E03.9        Weights up agai despite increase thyroid med, may be from inflammation from cellulitis? Monitor and tsh and t4 ordered. Consider cbc as well if doxy not helping rash/sx worsen      Electronically Signed By: Kady Conti MD   7/26/25  8:46 PM

## 2025-07-26 ASSESSMENT — ENCOUNTER SYMPTOMS
DIZZINESS: 0
DIARRHEA: 0
HEMATURIA: 0
DYSURIA: 0
VOMITING: 0
SHORTNESS OF BREATH: 0
COLOR CHANGE: 1
DYSPHORIC MOOD: 0
SORE THROAT: 0
COUGH: 0
WHEEZING: 0
CHILLS: 0
NERVOUS/ANXIOUS: 0
PALPITATIONS: 0
ARTHRALGIAS: 1
FATIGUE: 1
CONSTIPATION: 0
HEADACHES: 0
NAUSEA: 0
FEVER: 0
FREQUENCY: 0
ABDOMINAL PAIN: 0

## 2025-07-27 NOTE — PROGRESS NOTES
Subjective   Patient ID: Olga Joyce is a 60 y.o. female who is seen for long term care being seen and evaluated for multiple medical problems.    HPI she is doing fairly well this month. Labs in may showed nl lfts, ldl 85, nl gfr, thyroid function in range, no anemia. Had low total t3. And weight gain again noted. Meds increased. Recheck tsh and t4 ordered for beginning of August. She also had left hip redness and now on abx- doxy started. No fevers. Redness slightly  better per staff.       Review of Systems   Constitutional:  Positive for fatigue. Negative for chills and fever.   HENT:  Negative for congestion, hearing loss and sore throat.    Eyes:  Negative for visual disturbance.   Respiratory:  Negative for cough, shortness of breath and wheezing.    Cardiovascular:  Negative for chest pain, palpitations and leg swelling.   Gastrointestinal:  Negative for abdominal pain, constipation, diarrhea, nausea and vomiting.   Genitourinary:  Negative for dysuria, frequency, hematuria and urgency.   Musculoskeletal:  Positive for arthralgias and gait problem.   Skin:  Positive for color change and rash.   Neurological:  Negative for dizziness, syncope and headaches.   Psychiatric/Behavioral:  Negative for dysphoric mood. The patient is not nervous/anxious.        Objective   There were no vitals taken for this visit.    Physical Exam  Vitals reviewed: 132/68 68 97.1 wt 381-up 6 more.   Constitutional:       General: She is not in acute distress.     Appearance: She is not ill-appearing.   HENT:      Head: Normocephalic.      Nose: No congestion.      Mouth/Throat:      Mouth: Mucous membranes are dry.      Pharynx: Oropharynx is clear.     Eyes:      General: No scleral icterus.     Conjunctiva/sclera: Conjunctivae normal.       Cardiovascular:      Rate and Rhythm: Normal rate and regular rhythm.      Heart sounds:      No gallop.   Pulmonary:      Effort: Pulmonary effort is normal.      Breath sounds: No wheezing  or rhonchi.   Abdominal:      General: Bowel sounds are normal.      Tenderness: There is no abdominal tenderness. There is no rebound.     Musculoskeletal:      Cervical back: Neck supple.      Right lower leg: Edema present.      Left lower leg: Edema present.   Lymphadenopathy:      Cervical: No cervical adenopathy.     Skin:     General: Skin is warm and dry.      Findings: Erythema and rash present.     Neurological:      General: No focal deficit present.         Assessment/Plan   Problem List Items Addressed This Visit           ICD-10-CM    Primary hypertension I10     Other Visit Diagnoses         Codes      Cellulitis of left hip    -  Primary L03.116      Acquired hypothyroidism     E03.9        Weights up agai despite increase thyroid med, may be from inflammation from cellulitis? Monitor and tsh and t4 ordered. Consider cbc as well if doxy not helping rash/sx worsen

## 2025-08-22 ENCOUNTER — NURSING HOME VISIT (OUTPATIENT)
Dept: POST ACUTE CARE | Facility: EXTERNAL LOCATION | Age: 60
End: 2025-08-22
Payer: MEDICAID

## 2025-08-22 DIAGNOSIS — I48.91 ATRIAL FIBRILLATION WITH RVR (MULTI): ICD-10-CM

## 2025-08-22 DIAGNOSIS — G89.29 CHRONIC PAIN OF LEFT KNEE: ICD-10-CM

## 2025-08-22 DIAGNOSIS — I89.0 LYMPHEDEMA: ICD-10-CM

## 2025-08-22 DIAGNOSIS — M25.562 CHRONIC PAIN OF LEFT KNEE: ICD-10-CM

## 2025-08-22 DIAGNOSIS — I10 PRIMARY HYPERTENSION: Primary | ICD-10-CM

## 2025-08-22 DIAGNOSIS — E03.9 ACQUIRED HYPOTHYROIDISM: ICD-10-CM

## 2025-08-22 PROCEDURE — 99309 SBSQ NF CARE MODERATE MDM 30: CPT | Performed by: FAMILY MEDICINE

## 2025-08-24 ASSESSMENT — ENCOUNTER SYMPTOMS
ARTHRALGIAS: 1
DYSURIA: 0
FREQUENCY: 0
SORE THROAT: 0
FEVER: 0
PALPITATIONS: 0
ABDOMINAL PAIN: 0
DYSPHORIC MOOD: 0
SHORTNESS OF BREATH: 0
CONSTIPATION: 0
CHILLS: 0
VOMITING: 0
DIARRHEA: 0
WHEEZING: 0
NAUSEA: 0
DIZZINESS: 0
HEMATURIA: 0
HEADACHES: 0
NERVOUS/ANXIOUS: 0
COUGH: 0
FATIGUE: 1